# Patient Record
Sex: FEMALE | Race: ASIAN | Employment: OTHER | URBAN - METROPOLITAN AREA
[De-identification: names, ages, dates, MRNs, and addresses within clinical notes are randomized per-mention and may not be internally consistent; named-entity substitution may affect disease eponyms.]

---

## 2017-01-23 ENCOUNTER — APPOINTMENT (OUTPATIENT)
Dept: LAB | Age: 67
End: 2017-01-23
Attending: FAMILY MEDICINE
Payer: MEDICARE

## 2017-01-23 DIAGNOSIS — IMO0001 UNCONTROLLED TYPE 2 DIABETES MELLITUS WITHOUT COMPLICATION, WITHOUT LONG-TERM CURRENT USE OF INSULIN: ICD-10-CM

## 2017-01-23 DIAGNOSIS — E78.2 MIXED HYPERLIPIDEMIA: ICD-10-CM

## 2017-01-23 LAB
ALBUMIN SERPL-MCNC: 3.1 G/DL (ref 3.5–4.8)
ALP LIVER SERPL-CCNC: 154 U/L (ref 55–142)
ALT SERPL-CCNC: 24 U/L (ref 14–54)
AST SERPL-CCNC: 21 U/L (ref 15–41)
BILIRUB SERPL-MCNC: 0.5 MG/DL (ref 0.1–2)
BUN BLD-MCNC: 12 MG/DL (ref 8–20)
CALCIUM BLD-MCNC: 8.9 MG/DL (ref 8.3–10.3)
CHLORIDE: 101 MMOL/L (ref 101–111)
CHOLEST SMN-MCNC: 190 MG/DL (ref ?–200)
CO2: 31 MMOL/L (ref 22–32)
CREAT BLD-MCNC: 0.9 MG/DL (ref 0.55–1.02)
CREAT UR-SCNC: 109 MG/DL
EST. AVERAGE GLUCOSE BLD GHB EST-MCNC: 226 MG/DL (ref 68–126)
GLUCOSE BLD-MCNC: 170 MG/DL (ref 70–99)
HBA1C MFR BLD HPLC: 9.5 % (ref ?–5.7)
HDLC SERPL-MCNC: 61 MG/DL (ref 45–?)
HDLC SERPL: 3.11 {RATIO} (ref ?–4.44)
LDLC SERPL CALC-MCNC: 103 MG/DL (ref ?–130)
M PROTEIN MFR SERPL ELPH: 7.9 G/DL (ref 6.1–8.3)
MICROALBUMIN UR-MCNC: 28.7 MG/DL
MICROALBUMIN/CREAT 24H UR-RTO: 263.3 UG/MG (ref ?–30)
NONHDLC SERPL-MCNC: 129 MG/DL (ref ?–130)
POTASSIUM SERPL-SCNC: 3.6 MMOL/L (ref 3.6–5.1)
SODIUM SERPL-SCNC: 139 MMOL/L (ref 136–144)
TRIGLYCERIDES: 130 MG/DL (ref ?–150)
VLDL: 26 MG/DL (ref 5–40)

## 2017-01-23 PROCEDURE — 36415 COLL VENOUS BLD VENIPUNCTURE: CPT

## 2017-01-23 PROCEDURE — 83036 HEMOGLOBIN GLYCOSYLATED A1C: CPT

## 2017-01-23 PROCEDURE — 80061 LIPID PANEL: CPT

## 2017-01-23 PROCEDURE — 80053 COMPREHEN METABOLIC PANEL: CPT

## 2017-01-23 PROCEDURE — 82043 UR ALBUMIN QUANTITATIVE: CPT

## 2017-01-23 PROCEDURE — 82570 ASSAY OF URINE CREATININE: CPT

## 2017-02-22 ENCOUNTER — PRIOR ORIGINAL RECORDS (OUTPATIENT)
Dept: OTHER | Age: 67
End: 2017-02-22

## 2017-03-07 RX ORDER — METOPROLOL SUCCINATE 50 MG/1
TABLET, EXTENDED RELEASE ORAL
Qty: 90 TABLET | Refills: 0 | Status: SHIPPED | OUTPATIENT
Start: 2017-03-07 | End: 2017-06-08

## 2017-03-07 RX ORDER — AMLODIPINE BESYLATE 5 MG/1
TABLET ORAL
Qty: 90 TABLET | Refills: 0 | Status: SHIPPED | OUTPATIENT
Start: 2017-03-07 | End: 2017-06-02

## 2017-03-07 NOTE — TELEPHONE ENCOUNTER
Future Appointments  Date Time Provider Binu Barnes   3/24/2017 9:30 AM Vivian Solis MD EMG 21 EMG Rt 59     LOV 8./16     LAST LAB 1/17    LAST RX   AmLODIPine Besylate 5 MG Oral Tab 90 tablet 1 8/29/2016     Metoprolol Succinate ER 50 MG Oral

## 2017-03-21 ENCOUNTER — HOSPITAL ENCOUNTER (INPATIENT)
Facility: HOSPITAL | Age: 67
LOS: 2 days | Discharge: HOME HEALTH | DRG: 024 | End: 2017-03-24
Admitting: HOSPITALIST
Payer: MEDICARE

## 2017-03-21 ENCOUNTER — APPOINTMENT (OUTPATIENT)
Dept: CT IMAGING | Facility: HOSPITAL | Age: 67
DRG: 024 | End: 2017-03-21
Payer: MEDICARE

## 2017-03-21 DIAGNOSIS — I63.9 ACUTE ISCHEMIC STROKE (HCC): Primary | ICD-10-CM

## 2017-03-21 DIAGNOSIS — I48.20 CHRONIC ATRIAL FIBRILLATION (HCC): ICD-10-CM

## 2017-03-21 LAB
ALBUMIN SERPL-MCNC: 3.3 G/DL (ref 3.5–4.8)
ALP LIVER SERPL-CCNC: 152 U/L (ref 55–142)
ALT SERPL-CCNC: 29 U/L (ref 14–54)
APTT PPP: 31.4 SECONDS (ref 25–34)
AST SERPL-CCNC: 31 U/L (ref 15–41)
BASOPHILS # BLD AUTO: 0.07 X10(3) UL (ref 0–0.1)
BASOPHILS NFR BLD AUTO: 0.8 %
BILIRUB SERPL-MCNC: 0.4 MG/DL (ref 0.1–2)
BUN BLD-MCNC: 14 MG/DL (ref 8–20)
CALCIUM BLD-MCNC: 9.5 MG/DL (ref 8.3–10.3)
CHLORIDE: 102 MMOL/L (ref 101–111)
CO2: 31 MMOL/L (ref 22–32)
CREAT BLD-MCNC: 1.32 MG/DL (ref 0.55–1.02)
EOSINOPHIL # BLD AUTO: 0.6 X10(3) UL (ref 0–0.3)
EOSINOPHIL NFR BLD AUTO: 7.2 %
ERYTHROCYTE [DISTWIDTH] IN BLOOD BY AUTOMATED COUNT: 13.3 % (ref 11.5–16)
GLUCOSE BLD-MCNC: 116 MG/DL (ref 70–99)
HCT VFR BLD AUTO: 44.2 % (ref 34–50)
HGB BLD-MCNC: 14.8 G/DL (ref 12–16)
IMMATURE GRANULOCYTE COUNT: 0.02 X10(3) UL (ref 0–1)
IMMATURE GRANULOCYTE RATIO %: 0.2 %
INR BLD: 1.13 (ref 0.89–1.11)
LYMPHOCYTES # BLD AUTO: 3.14 X10(3) UL (ref 0.9–4)
LYMPHOCYTES NFR BLD AUTO: 37.9 %
M PROTEIN MFR SERPL ELPH: 8.6 G/DL (ref 6.1–8.3)
MCH RBC QN AUTO: 28.1 PG (ref 27–33.2)
MCHC RBC AUTO-ENTMCNC: 33.5 G/DL (ref 31–37)
MCV RBC AUTO: 84 FL (ref 81–100)
MONOCYTES # BLD AUTO: 0.72 X10(3) UL (ref 0.1–0.6)
MONOCYTES NFR BLD AUTO: 8.7 %
NEUTROPHIL ABS PRELIM: 3.74 X10 (3) UL (ref 1.3–6.7)
NEUTROPHILS # BLD AUTO: 3.74 X10(3) UL (ref 1.3–6.7)
NEUTROPHILS NFR BLD AUTO: 45.2 %
PLATELET # BLD AUTO: 269 10(3)UL (ref 150–450)
POTASSIUM SERPL-SCNC: 3.5 MMOL/L (ref 3.6–5.1)
PSA SERPL DL<=0.01 NG/ML-MCNC: 14.6 SECONDS (ref 12–14.3)
RBC # BLD AUTO: 5.26 X10(6)UL (ref 3.8–5.1)
RED CELL DISTRIBUTION WIDTH-SD: 41.1 FL (ref 35.1–46.3)
SODIUM SERPL-SCNC: 139 MMOL/L (ref 136–144)
TROPONIN: <0.046 NG/ML (ref ?–0.05)
WBC # BLD AUTO: 8.3 X10(3) UL (ref 4–13)

## 2017-03-21 PROCEDURE — 70496 CT ANGIOGRAPHY HEAD: CPT

## 2017-03-21 PROCEDURE — 70498 CT ANGIOGRAPHY NECK: CPT

## 2017-03-22 ENCOUNTER — APPOINTMENT (OUTPATIENT)
Dept: CV DIAGNOSTICS | Facility: HOSPITAL | Age: 67
DRG: 024 | End: 2017-03-22
Attending: Other
Payer: MEDICARE

## 2017-03-22 ENCOUNTER — APPOINTMENT (OUTPATIENT)
Dept: CT IMAGING | Facility: HOSPITAL | Age: 67
DRG: 024 | End: 2017-03-22
Payer: MEDICARE

## 2017-03-22 ENCOUNTER — PRIOR ORIGINAL RECORDS (OUTPATIENT)
Dept: OTHER | Age: 67
End: 2017-03-22

## 2017-03-22 ENCOUNTER — APPOINTMENT (OUTPATIENT)
Dept: MRI IMAGING | Facility: HOSPITAL | Age: 67
DRG: 024 | End: 2017-03-22
Attending: Other
Payer: MEDICARE

## 2017-03-22 ENCOUNTER — APPOINTMENT (OUTPATIENT)
Dept: GENERAL RADIOLOGY | Facility: HOSPITAL | Age: 67
DRG: 024 | End: 2017-03-22
Payer: MEDICARE

## 2017-03-22 ENCOUNTER — APPOINTMENT (OUTPATIENT)
Dept: INTERVENTIONAL RADIOLOGY/VASCULAR | Facility: HOSPITAL | Age: 67
DRG: 024 | End: 2017-03-22
Payer: MEDICARE

## 2017-03-22 PROBLEM — E87.6 HYPOKALEMIA: Status: ACTIVE | Noted: 2017-03-22

## 2017-03-22 PROBLEM — R73.9 HYPERGLYCEMIA: Status: ACTIVE | Noted: 2017-03-22

## 2017-03-22 PROBLEM — I63.9 ACUTE ISCHEMIC STROKE (HCC): Status: ACTIVE | Noted: 2017-03-22

## 2017-03-22 LAB
ALBUMIN SERPL-MCNC: 2.6 G/DL (ref 3.5–4.8)
ALP LIVER SERPL-CCNC: 114 U/L (ref 55–142)
ALT SERPL-CCNC: 19 U/L (ref 14–54)
AST SERPL-CCNC: 17 U/L (ref 15–41)
ATRIAL RATE: 153 BPM
BILIRUB DIRECT SERPL-MCNC: 0.1 MG/DL (ref 0.1–0.5)
BILIRUB SERPL-MCNC: 0.3 MG/DL (ref 0.1–2)
BILIRUB UR QL STRIP.AUTO: NEGATIVE
BUN BLD-MCNC: 14 MG/DL (ref 8–20)
CALCIUM BLD-MCNC: 8 MG/DL (ref 8.3–10.3)
CHLORIDE: 108 MMOL/L (ref 101–111)
CHOLEST SMN-MCNC: 157 MG/DL (ref ?–200)
CLARITY UR REFRACT.AUTO: CLEAR
CO2: 25 MMOL/L (ref 22–32)
COLOR UR AUTO: COLORLESS
CREAT BLD-MCNC: 0.84 MG/DL (ref 0.55–1.02)
ERYTHROCYTE [DISTWIDTH] IN BLOOD BY AUTOMATED COUNT: 13.6 % (ref 11.5–16)
EST. AVERAGE GLUCOSE BLD GHB EST-MCNC: 217 MG/DL (ref 68–126)
GLUCOSE BLD-MCNC: 144 MG/DL (ref 65–99)
GLUCOSE BLD-MCNC: 163 MG/DL (ref 65–99)
GLUCOSE BLD-MCNC: 219 MG/DL (ref 65–99)
GLUCOSE BLD-MCNC: 254 MG/DL (ref 70–99)
GLUCOSE UR STRIP.AUTO-MCNC: NEGATIVE MG/DL
HBA1C MFR BLD HPLC: 9.2 % (ref ?–5.7)
HCT VFR BLD AUTO: 35.2 % (ref 34–50)
HDLC SERPL-MCNC: 50 MG/DL (ref 45–?)
HDLC SERPL: 3.14 {RATIO} (ref ?–4.44)
HGB BLD-MCNC: 11.4 G/DL (ref 12–16)
INR BLD: 1.31 (ref 0.89–1.11)
ISTAT ACTIVATED CLOTTING TIME: 152 SECONDS (ref 74–137)
ISTAT ACTIVATED CLOTTING TIME: 173 SECONDS (ref 74–137)
KETONES UR STRIP.AUTO-MCNC: NEGATIVE MG/DL
LDLC SERPL CALC-MCNC: 79 MG/DL (ref ?–130)
LEUKOCYTE ESTERASE UR QL STRIP.AUTO: NEGATIVE
LIPASE: 117 U/L (ref 73–393)
M PROTEIN MFR SERPL ELPH: 6.5 G/DL (ref 6.1–8.3)
MCH RBC QN AUTO: 27.7 PG (ref 27–33.2)
MCHC RBC AUTO-ENTMCNC: 32.4 G/DL (ref 31–37)
MCV RBC AUTO: 85.6 FL (ref 81–100)
NITRITE UR QL STRIP.AUTO: NEGATIVE
NONHDLC SERPL-MCNC: 107 MG/DL (ref ?–130)
PH UR STRIP.AUTO: 8 [PH] (ref 4.5–8)
PLATELET # BLD AUTO: 191 10(3)UL (ref 150–450)
POTASSIUM SERPL-SCNC: 3.3 MMOL/L (ref 3.6–5.1)
POTASSIUM SERPL-SCNC: 3.6 MMOL/L (ref 3.6–5.1)
PROT UR STRIP.AUTO-MCNC: NEGATIVE MG/DL
PSA SERPL DL<=0.01 NG/ML-MCNC: 16.4 SECONDS (ref 12–14.3)
Q-T INTERVAL: 368 MS
QRS DURATION: 72 MS
QTC CALCULATION (BEZET): 450 MS
R AXIS: 21 DEGREES
RBC # BLD AUTO: 4.11 X10(6)UL (ref 3.8–5.1)
RBC UR QL AUTO: NEGATIVE
RED CELL DISTRIBUTION WIDTH-SD: 42.1 FL (ref 35.1–46.3)
SODIUM SERPL-SCNC: 141 MMOL/L (ref 136–144)
SP GR UR STRIP.AUTO: 1.01 (ref 1–1.03)
T AXIS: 37 DEGREES
TRIGLYCERIDES: 140 MG/DL (ref ?–150)
TSI SER-ACNC: 2.56 MIU/ML (ref 0.35–5.5)
UROBILINOGEN UR STRIP.AUTO-MCNC: <2 MG/DL
VENTRICULAR RATE: 90 BPM
VLDL: 28 MG/DL (ref 5–40)
WBC # BLD AUTO: 9.2 X10(3) UL (ref 4–13)

## 2017-03-22 PROCEDURE — 93306 TTE W/DOPPLER COMPLETE: CPT | Performed by: INTERNAL MEDICINE

## 2017-03-22 PROCEDURE — 93306 TTE W/DOPPLER COMPLETE: CPT

## 2017-03-22 PROCEDURE — B316YZZ FLUOROSCOPY OF RIGHT INTERNAL CAROTID ARTERY USING OTHER CONTRAST: ICD-10-PCS

## 2017-03-22 PROCEDURE — 71010 XR CHEST AP PORTABLE  (CPT=71010): CPT

## 2017-03-22 PROCEDURE — 70450 CT HEAD/BRAIN W/O DYE: CPT

## 2017-03-22 PROCEDURE — 70551 MRI BRAIN STEM W/O DYE: CPT

## 2017-03-22 PROCEDURE — 03CG3ZZ EXTIRPATION OF MATTER FROM INTRACRANIAL ARTERY, PERCUTANEOUS APPROACH: ICD-10-PCS

## 2017-03-22 PROCEDURE — 99223 1ST HOSP IP/OBS HIGH 75: CPT | Performed by: HOSPITALIST

## 2017-03-22 PROCEDURE — 99291 CRITICAL CARE FIRST HOUR: CPT | Performed by: OTHER

## 2017-03-22 RX ORDER — ONDANSETRON 2 MG/ML
4 INJECTION INTRAMUSCULAR; INTRAVENOUS EVERY 6 HOURS PRN
Status: DISCONTINUED | OUTPATIENT
Start: 2017-03-22 | End: 2017-03-24

## 2017-03-22 RX ORDER — FAMOTIDINE 20 MG/1
20 TABLET ORAL 2 TIMES DAILY
Status: DISCONTINUED | OUTPATIENT
Start: 2017-03-22 | End: 2017-03-22

## 2017-03-22 RX ORDER — MELATONIN
100 DAILY
Status: DISCONTINUED | OUTPATIENT
Start: 2017-03-22 | End: 2017-03-24

## 2017-03-22 RX ORDER — ONDANSETRON 2 MG/ML
4 INJECTION INTRAMUSCULAR; INTRAVENOUS EVERY 4 HOURS PRN
Status: DISCONTINUED | OUTPATIENT
Start: 2017-03-22 | End: 2017-03-22

## 2017-03-22 RX ORDER — ONDANSETRON 2 MG/ML
4 INJECTION INTRAMUSCULAR; INTRAVENOUS AS NEEDED
Status: DISCONTINUED | OUTPATIENT
Start: 2017-03-22 | End: 2017-03-22

## 2017-03-22 RX ORDER — ASPIRIN 325 MG
325 TABLET ORAL DAILY
Status: DISCONTINUED | OUTPATIENT
Start: 2017-03-22 | End: 2017-03-23

## 2017-03-22 RX ORDER — SODIUM CHLORIDE 9 MG/ML
INJECTION, SOLUTION INTRAVENOUS CONTINUOUS
Status: DISCONTINUED | OUTPATIENT
Start: 2017-03-22 | End: 2017-03-22

## 2017-03-22 RX ORDER — DEXTROSE MONOHYDRATE 25 G/50ML
50 INJECTION, SOLUTION INTRAVENOUS
Status: DISCONTINUED | OUTPATIENT
Start: 2017-03-22 | End: 2017-03-24

## 2017-03-22 RX ORDER — NITROGLYCERIN 20 MG/100ML
INJECTION INTRAVENOUS
Status: COMPLETED
Start: 2017-03-22 | End: 2017-03-22

## 2017-03-22 RX ORDER — FOLIC ACID 1 MG/1
1 TABLET ORAL DAILY
Status: DISCONTINUED | OUTPATIENT
Start: 2017-03-22 | End: 2017-03-24

## 2017-03-22 RX ORDER — ASPIRIN 300 MG
300 SUPPOSITORY, RECTAL RECTAL ONCE
Status: COMPLETED | OUTPATIENT
Start: 2017-03-22 | End: 2017-03-22

## 2017-03-22 RX ORDER — FAMOTIDINE 10 MG/ML
20 INJECTION, SOLUTION INTRAVENOUS EVERY 12 HOURS SCHEDULED
Status: DISCONTINUED | OUTPATIENT
Start: 2017-03-22 | End: 2017-03-24

## 2017-03-22 RX ORDER — ACETAMINOPHEN 10 MG/ML
1000 INJECTION, SOLUTION INTRAVENOUS AS NEEDED
Status: ACTIVE | OUTPATIENT
Start: 2017-03-22 | End: 2017-03-22

## 2017-03-22 RX ORDER — POTASSIUM CHLORIDE 20 MEQ/1
40 TABLET, EXTENDED RELEASE ORAL EVERY 4 HOURS
Status: COMPLETED | OUTPATIENT
Start: 2017-03-22 | End: 2017-03-22

## 2017-03-22 RX ORDER — HEPARIN SODIUM 5000 [USP'U]/ML
INJECTION, SOLUTION INTRAVENOUS; SUBCUTANEOUS
Status: COMPLETED
Start: 2017-03-22 | End: 2017-03-22

## 2017-03-22 RX ORDER — NALOXONE HYDROCHLORIDE 0.4 MG/ML
80 INJECTION, SOLUTION INTRAMUSCULAR; INTRAVENOUS; SUBCUTANEOUS AS NEEDED
Status: ACTIVE | OUTPATIENT
Start: 2017-03-22 | End: 2017-03-22

## 2017-03-22 RX ORDER — ASPIRIN 325 MG
325 TABLET ORAL ONCE
Status: DISCONTINUED | OUTPATIENT
Start: 2017-03-22 | End: 2017-03-22 | Stop reason: SDUPTHER

## 2017-03-22 RX ORDER — SODIUM CHLORIDE 9 MG/ML
1000 INJECTION, SOLUTION INTRAVENOUS ONCE
Status: COMPLETED | OUTPATIENT
Start: 2017-03-22 | End: 2017-03-22

## 2017-03-22 RX ORDER — LABETALOL HYDROCHLORIDE 5 MG/ML
10 INJECTION, SOLUTION INTRAVENOUS EVERY 10 MIN PRN
Status: DISCONTINUED | OUTPATIENT
Start: 2017-03-22 | End: 2017-03-24

## 2017-03-22 RX ORDER — ACETAMINOPHEN 325 MG/1
TABLET ORAL
Status: COMPLETED
Start: 2017-03-22 | End: 2017-03-22

## 2017-03-22 RX ORDER — FAMOTIDINE 20 MG/1
20 TABLET ORAL EVERY 12 HOURS SCHEDULED
Status: DISCONTINUED | OUTPATIENT
Start: 2017-03-22 | End: 2017-03-24

## 2017-03-22 RX ORDER — SODIUM CHLORIDE, SODIUM LACTATE, POTASSIUM CHLORIDE, CALCIUM CHLORIDE 600; 310; 30; 20 MG/100ML; MG/100ML; MG/100ML; MG/100ML
INJECTION, SOLUTION INTRAVENOUS CONTINUOUS
Status: DISCONTINUED | OUTPATIENT
Start: 2017-03-22 | End: 2017-03-22

## 2017-03-22 RX ORDER — DOXEPIN HYDROCHLORIDE 50 MG/1
1 CAPSULE ORAL DAILY
Status: DISCONTINUED | OUTPATIENT
Start: 2017-03-22 | End: 2017-03-24

## 2017-03-22 RX ORDER — ACETAMINOPHEN 325 MG/1
650 TABLET ORAL EVERY 6 HOURS PRN
Status: DISCONTINUED | OUTPATIENT
Start: 2017-03-22 | End: 2017-03-24

## 2017-03-22 RX ORDER — SODIUM CHLORIDE 9 MG/ML
INJECTION, SOLUTION INTRAVENOUS CONTINUOUS
Status: ACTIVE | OUTPATIENT
Start: 2017-03-22 | End: 2017-03-24

## 2017-03-22 RX ORDER — VERAPAMIL HYDROCHLORIDE 2.5 MG/ML
INJECTION, SOLUTION INTRAVENOUS
Status: DISCONTINUED
Start: 2017-03-22 | End: 2017-03-22 | Stop reason: WASHOUT

## 2017-03-22 RX ORDER — SODIUM CHLORIDE 9 MG/ML
INJECTION, SOLUTION INTRAVENOUS ONCE AS NEEDED
Status: COMPLETED | OUTPATIENT
Start: 2017-03-22 | End: 2017-03-22

## 2017-03-22 RX ORDER — LIDOCAINE HYDROCHLORIDE 10 MG/ML
INJECTION, SOLUTION INFILTRATION; PERINEURAL
Status: COMPLETED
Start: 2017-03-22 | End: 2017-03-22

## 2017-03-22 NOTE — ED NOTES
Pt arrives via EMS for stroke symptoms, last normal at 2030, according to family she was seen without symptoms at dinner. Daughter noted slurred speech and facial droop at \"around 10 pm\" and 911 was called.  Dr. Deangelo Hernandez is at bedside upon arrival.

## 2017-03-22 NOTE — PLAN OF CARE
METABOLIC/FLUID AND ELECTROLYTES - ADULT    • Electrolytes maintained within normal limits Progressing        NEUROLOGICAL - ADULT    • Achieves stable or improved neurological status Progressing        RESPIRATORY - ADULT    • Achieves optimal ventilation

## 2017-03-22 NOTE — PROGRESS NOTES
[facility]  Progress Note     Sunny Espinosa Patient Status:  Inpatient    1950 MRN [id number]   [facility] 6NE-A Attending Conner Rodríguez MD   Hosp Day # 1 PCP Jordana Amaya MD     Chief Complaint:  Left sided weakness an Date   TSH 2.560 03/22/2017       Imaging: Imaging data reviewed in Epic.   MICRO:   Medications:   • famoTIDine  20 mg Oral 2 times per day    Or   • famoTIDine  20 mg Intravenous 2 times per day   • folic acid  1 mg Oral Daily   • Vitamin B-1  100 mg Oral

## 2017-03-22 NOTE — PROGRESS NOTES
[facility]  Interventional Neuroradiology Progress Note    Glo Summers Patient Status:  Inpatient    1950 MRN [id number]   [facility] 6NE-A Attending Madelaine Cardenas MD   Hosp Day # 1 PCP Erika Holloway MD       Subjective: .0 03/21/2017   CREATSERUM 0.84 03/22/2017   BUN 14 03/22/2017    03/22/2017   K 3.3 03/22/2017    03/22/2017   CO2 25.0 03/22/2017    03/22/2017   CA 8.0 03/22/2017   ALB 2.6 03/22/2017   ALKPHO 114 03/22/2017   BILT 0.3 03/22/ in both upper lobes.        Assessment/Plan:  1. L-sided facial droop and weakness, s/p mechanical thrombectomy by Dr. Mackey Nurse, with TICI 2b reperfusion  1. -160  2. [place] following  3. Ischemic order set in place  4.  Repeat CTH today (3/22) no acute p

## 2017-03-22 NOTE — ED PROVIDER NOTES
Patient Seen in: [facility] Emergency Department    History   Patient presents with:  Stroke (neurologic)    Stated Complaint: stroke    HPI    Patient is a 12-year-old who has a history of diabetes and atrial fibrillation presenting with a complain daily with meals. Losartan Potassium 25 MG Oral Tab,  Take 1 tablet (25 mg total) by mouth once daily. ONETOUCH DELICA LANCETS FINE Does not apply Misc,  1 each by Other route 2 (two) times daily.    Glucose Blood (ONETOUCH ULTRA BLUE) In Vitro Strip, costovertebral angle tenderness. Extremities: Warm, well perfused, without edema    No significant deformity or joint abnormality    Calves are symmetric and nontender  Good peripheral color, cap refill .        Skin: Unremarkable without lesions or rachel ------                     CBC W/ DIFFERENTIAL[[id number]]          Abnormal            Final result                 Please view results for these tests on the individual orders.    RAINBOW DRAW BLUE   RAINBOW DRAW GOLD   RAINBOW DRAW LAVENDER   RAINBOW DRAW parietal lobes consistent with chronic microvascular ischemic changes. Superimposed acute infarction is difficult to exclude. There is no acute intracranial hemorrhage. There is no mass effect or midline shift.   No abnormal intra-or extra-axial fluid genny carotid artery is widely patent. There is a patent bifurcation of the right common carotid artery. Right external and right internal carotid arteries are patent. Mild tortuosity of the proximal right ICA.  No significant right internal carotid artery stenos occlusion involving the distal M1 branch of the right middle cerebral artery. There is some collateral filling of the more distal right middle cerebral artery branches.  The critical test result findings were initially called to  Dr. Celia Jimenes at 11:53 PM on candidate, I spoke with the radiologist repeatedly with regard to quick readings on the CTs and immediately spoke with the neuro interventionalist Dr. Ev Miguel for consultation. He is calling in the team to take the patient to the lab for intervention.   Minda Melgar

## 2017-03-22 NOTE — SIGNIFICANT EVENT
Responded to code stroke paged at 23:39 to ED A3. This RN met pt in CT scan at 23:44. Pt is a 79 y.o. female with hx of afib, DM, HTN and family hx of CVA. Current medications include eliquis. Pt last seen nl per family around 2030 while eating dinner.  Sym

## 2017-03-22 NOTE — PAYOR COMM NOTE
Attending Physician: Jarred Cunningham MD    Review Type: ADMISSION   Reviewer: Flor Mckeon       Date: March 22, 2017 - 8:27 AM  Payor: [address] Number: N/A  Admit date: 3/21/2017 11:22 PM   Admitted from Emergency Dept.: yes with appropriate read back. 2. There is mediastinal lymphadenopathy. Please correlate clinically. Differential considerations include lymphoproliferative processes and metastatic disease.  Lymphoma is within the differential. Reactive inflammatory adenopath (*)     Potassium 3.3 (*)     All other components within normal limits   PROTHROMBIN TIME (PT) - Abnormal; Notable for the following:     PT 16.4 (*)     INR 1.31 (*)     All other components within normal limits   POCT ISTAT ACTIVATED CLOTTING TIME - Abn not a TPA candidate  2. Chronic atrial fibrillation on Eliquis heart rate controlled  3. Incidental finding of mediastinal lymphadenopathy to be evaluated further  4. Pulmonary fibrosis noted in both upper lobes  5. Diabetes type 2 controlled  6.  Permissiv

## 2017-03-22 NOTE — PHYSICAL THERAPY NOTE
Attempted to see patient this morning, however currently on bedrest for 24 hours. Will follow up with patient tomorrow.

## 2017-03-22 NOTE — ED NOTES
Pt transported to Interventional Lab, accompanied by stroke navigator Henny Cuevas RN and this RN/monitor. Pt assisted up to [facility] prior to transfer and lower extremity weakness noted and both pt and daughter acknowledge that pt is too weak to be up OOB.  Bedpan

## 2017-03-22 NOTE — DIETARY NOTE
Nutrition Short Note    Dietitian consult received for diabetes diet education. Appropriate education and handout provided to pt's family member at bedside. All questions answered. RD available PRN.     Stephen Victoria RD, LDN

## 2017-03-22 NOTE — ED NOTES
Pt demanding to get OOB to go to BR. Pt refusing bedpan, and family unable to persuade pt to try bedpan. Pt is combative and insisting she wants to walk to BR. BSC was offered and pt assisted to [facility], female family members are at bedside throughout.  Pt's pr

## 2017-03-22 NOTE — PROGRESS NOTES
[facility]  Neurocritical Care APN Progress Note    NAME: Catrina Sapp - ROOM: FATMATA/FATMATA - MRN: [id number] - Age: 79year old - :1950    History Of Present Illness:  Catrina Sapp is a 79year old female with PMHx significant Skin: Skin color, texture, turgor normal for ethnicity, no rashes or lesions, warm and dry  Neurologic: This patient is alert and orientated x 3. Speech fluent. Pupils equally round and reactive to light. 3+ brisk bilaterally. EOMs intact.   Visual field findings are suspicious for hyperdense MCA sign and intraluminal thrombus.  Differential would include vascular mural calcifications.      There is decreased attenuation in the periventricular and subcortical deep white matter bilaterally in the frontal, te The left subclavian artery origin is widely patent. The proximal left vertebral artery is tortuous but widely patent. The left vertebral artery is patent throughout its course without evidence for dissection or significant stenosis.  There is a patent    le         There is mediastinal lymphadenopathy. Prevascular space lymph node measures 2.6 x 1.8 cm. Right lower paratracheal node measures 1.8 x 1.2 cm. Left upper paratracheal node measures 1.0 x 1.2 cm.  Aortopulmonary window node measures 1.5 x 1.0 cm.   TP 8.6 03/21/2017   AST 31 03/21/2017   ALT 29 03/21/2017   PTT 31.4 03/21/2017   INR 1.13 03/21/2017       Assessment/Plan:  1.  Ischemic Stroke/ M1 right MCA occulusion s/p thrombectomy       - Ischemic stroke order set      - Neuro checks Q1h      - NIH

## 2017-03-22 NOTE — PROCEDURES
[facility]  Interventional Neuroradiology Procedure Note      Ambrocio Villegas Patient Status:  Emergency    1950 MRN [id number]   Location [address] [facility] Attending Nilsa Pichardo MD   [facility] Day # 1 PCP Deepak Matos

## 2017-03-22 NOTE — OCCUPATIONAL THERAPY NOTE
Attempted to see patient this AM, however currently on bedrest for 24 hours.  Will follow up with patient tomorrow

## 2017-03-22 NOTE — CONSULTS
[facility]  Neurocritical Care       Name: Lupillo Hodge  MRN: [id number]  Admission Date/Time: 3/21/2017 11:22 PM  Primary Care Provider:  Alona Moore MD        Reason for Consultation:   Left sided weakness - Acute RMCA M1 occ BESYLATE 5 MG Oral Tab TAKE 1 TABLET(5 MG) BY MOUTH EVERY DAY Disp: 90 tablet Rfl: 0    METOPROLOL SUCCINATE ER 50 MG Oral Tablet 24 Hr TAKE 1 TABLET(50 MG) BY MOUTH EVERY DAY Disp: 90 tablet Rfl: 0    apixaban 2.5 MG Oral Tab Take 5 mg by mouth 2 (two) ti Facility-Administered Medications:  Labetalol HCl (TRANDATE) injection 10 mg 10 mg Intravenous Q10 Min PRN   niCARdipine HCl in NaCl (CARDENE) 20 mg/200 ml premix infusion 5-15 mg/hr Intravenous Continuous PRN   aspirin tab 325 mg 325 mg Oral Once   Atropi currently breastfeeding. Body mass index is 28.98 kg/(m^2). Intake/Output:  No intake or output data in the 24 hours ending 03/22/17 0730    HEENT- NC/AT, Neck supple.   Lungs - Clear  Heart - S1,S2    NEUROLOGICAL EXAMINATION:    Mental Awake, alert, o increased density in the distal M1 segment of the right middle cerebral artery. There is also a focus of increased density within a posterior branch of the right MCA within the sylvian fissure.  The findings are suspicious for hyperdense MCA sign and intral vertebral artery is tortuous but widely patent. The left vertebral artery is patent throughout its course without evidence for dissection or significant stenosis. There is a patent left PICA. The basilar artery and its branches appear widely patent.  The b paratracheal node measures 1.8 x 1.2 cm. Left upper paratracheal node measures 1.0 x 1.2 cm. Aortopulmonary window node measures 1.5 x 1.0 cm.  Subpleural nodularity identified in the right upper lobe with a 4 mm noncalcified pulmonary nodule seen on image  03/22/2017   TROP <0.046 03/21/2017     Recent Labs   Lab  03/21/17   2325  03/22/17   0600   GLU  116*  254*   BUN  14  14   CREATSERUM  1.32*  0.84   CA  9.5  8.0*   ALB  3.3*  2.6*   NA  139  141   K  3.5*  3.3*   CL  102  108   CO2  31.0  25. Spoke with family at bedside and gave them an update. G: [place] no, [place] no    Goals of the Day:  -160, PT/OT today, Ct Marilyn Monica today.     A total of 35 minutes of critical care time (exclusive of billable procedures) was administered to [facility]

## 2017-03-22 NOTE — SLP NOTE
ADULT SWALLOWING EVALUATION    ASSESSMENT & PLAN   ASSESSMENT  Patient seen for swallowing evaluation per stroke protocol. Patient alert with daughter at bedside, supportive and contributing to history.   Patient left facial droop almost completely resolve Diet Prior to Admission: Regular; Thin liquids  Precautions: Aspiration    Patient/Family Goals: To eat and drink safely    SWALLOWING HISTORY  Current Diet Consistency: Regular; Thin liquids  Dysphagia History: as above  Imaging Results:   CXR from 3/22/1 (mionimal)  Range of Motion: Reduced left facial  Rate of Motion: Within Functional Limits    Voice Quality: Clear  Respiratory Status: Unlabored  Consistencies Trialed:  Thin liquids;Puree;Hard solid  Method of Presentation: Self presentation;Cup;Single si

## 2017-03-22 NOTE — H&P
[facility]  History and Physical     Donovan Weaver Patient Status:  Emergency    1950 MRN [id number]   Location 6[address] [facility] Attending Kit Prieto MD   [facility] Day # 1 PCP Jeana Pringle MD     Chief Complain encounter.   Current Outpatient Prescriptions on File Prior to Encounter:  AMLODIPINE BESYLATE 5 MG Oral Tab TAKE 1 TABLET(5 MG) BY MOUTH EVERY DAY Disp: 90 tablet Rfl: 0   METOPROLOL SUCCINATE ER 50 MG Oral Tablet 24 Hr TAKE 1 TABLET(50 MG) BY MOUTH EVERY deformity. Abdomen: Soft, nontender, nondistended. Positive bowel sounds. No rebound, guarding or organomegaly. Neurologic: left ue and left le strength 1/5, slurred speech, neglect  Musculoskeletal: Moves all extremities.   Extremities: No edema or cyan

## 2017-03-23 ENCOUNTER — APPOINTMENT (OUTPATIENT)
Dept: CT IMAGING | Facility: HOSPITAL | Age: 67
DRG: 024 | End: 2017-03-23
Attending: NURSE PRACTITIONER
Payer: MEDICARE

## 2017-03-23 LAB
BUN BLD-MCNC: 6 MG/DL (ref 8–20)
CALCIUM BLD-MCNC: 8.3 MG/DL (ref 8.3–10.3)
CHLORIDE: 106 MMOL/L (ref 101–111)
CO2: 26 MMOL/L (ref 22–32)
CREAT BLD-MCNC: 0.7 MG/DL (ref 0.55–1.02)
GLUCOSE BLD-MCNC: 168 MG/DL (ref 65–99)
GLUCOSE BLD-MCNC: 195 MG/DL (ref 65–99)
GLUCOSE BLD-MCNC: 207 MG/DL (ref 65–99)
GLUCOSE BLD-MCNC: 233 MG/DL (ref 70–99)
HAV IGM SER QL: 1.8 MG/DL (ref 1.7–3)
PHOSPHATE SERPL-MCNC: 1.5 MG/DL (ref 2.5–4.9)
POTASSIUM SERPL-SCNC: 3.3 MMOL/L (ref 3.6–5.1)
SODIUM SERPL-SCNC: 140 MMOL/L (ref 136–144)

## 2017-03-23 PROCEDURE — 70450 CT HEAD/BRAIN W/O DYE: CPT

## 2017-03-23 PROCEDURE — 99233 SBSQ HOSP IP/OBS HIGH 50: CPT | Performed by: OTHER

## 2017-03-23 PROCEDURE — 99233 SBSQ HOSP IP/OBS HIGH 50: CPT | Performed by: HOSPITALIST

## 2017-03-23 RX ORDER — POTASSIUM CHLORIDE 20 MEQ/1
40 TABLET, EXTENDED RELEASE ORAL EVERY 4 HOURS
Status: COMPLETED | OUTPATIENT
Start: 2017-03-23 | End: 2017-03-23

## 2017-03-23 RX ORDER — MAGNESIUM OXIDE 400 MG (241.3 MG MAGNESIUM) TABLET
400 TABLET ONCE
Status: COMPLETED | OUTPATIENT
Start: 2017-03-23 | End: 2017-03-23

## 2017-03-23 RX ORDER — LOSARTAN POTASSIUM 25 MG/1
25 TABLET ORAL DAILY
Status: DISCONTINUED | OUTPATIENT
Start: 2017-03-24 | End: 2017-03-24

## 2017-03-23 RX ORDER — HEPARIN SODIUM 5000 [USP'U]/ML
5000 INJECTION, SOLUTION INTRAVENOUS; SUBCUTANEOUS EVERY 8 HOURS SCHEDULED
Status: DISCONTINUED | OUTPATIENT
Start: 2017-03-23 | End: 2017-03-23

## 2017-03-23 NOTE — PROGRESS NOTES
[facility]  Interventional Neuroradiology Progress Note    Berta Fry Patient Status:  Inpatient    1950 MRN [id number]   [facility] 6NE-A Attending Moy Raygoza MD   Hosp Day # 2 PCP Omid Clark MD       Subjective: Date   WBC 9.2 03/22/2017   HGB 11.4 03/22/2017   HCT 35.2 03/22/2017   .0 03/22/2017   CREATSERUM 0.70 03/23/2017   BUN 6 03/23/2017    03/23/2017   K 3.3 03/23/2017    03/23/2017   CO2 26.0 03/23/2017    03/23/2017   CA 8.3 03/2 lymphadenopathy. Please correlate clinically. Differential considerations include lymphoproliferative processes and metastatic disease.  Lymphoma is within the differential. Reactive inflammatory adenopathy is also within the    differential. Followup is ne

## 2017-03-23 NOTE — OCCUPATIONAL THERAPY NOTE
OCCUPATIONAL THERAPY EVALUATION - INPATIENT     Room Number: 4933/1030-X  Evaluation Date: 3/23/2017  Type of Evaluation: Initial  Presenting Problem: CVA    Physician Order: IP Consult to Occupational Therapy  Reason for Therapy: ADL/IADL Dysfunction and limits    SENSATION  Light touch:  intact    Communication: Pt is able to communicate all basic needs and wants    Behavioral/Emotional/Social: Pt was participated in and was motivated for therapy today.     RANGE OF MOTION AND STRENGTH ASSESSMENT  Upper ex dysfunction, activity tolerance. These deficits manifest functionally while performing mobility and self-care.    The patient is below baseline and would benefit from skilled inpatient OT to address the above deficits, maximizing patient's ability to retur

## 2017-03-23 NOTE — PLAN OF CARE
Pt alert and oriented x 4; STEPHENS with minimal Lt sided weakness;  Lt facial droop; speech clear;   CARDIOVASCULAR - ADULT    • Absence of cardiac arrhythmias or at baseline Progressing        Impaired Functional Mobility    • Achieve highest/safest level of m

## 2017-03-23 NOTE — PLAN OF CARE
Transferred from unit around 11. A/Ox4. Afib per tele. RA. Neurologically intact. No deficits noted. Groin site c/d/i, soft. C/o mild pain r/t groin site. Relieved with tylenol. Needs attended to.     CARDIOVASCULAR - ADULT    • Maintains optimal cardi

## 2017-03-23 NOTE — PHYSICAL THERAPY NOTE
PHYSICAL THERAPY EVALUATION - INPATIENT     Room Number: 2493/8768-H  Evaluation Date: 3/23/2017  Type of Evaluation: Initial  Physician Order: PT Eval and Treat    Presenting Problem: s/p CVA 3/21/17 and thrombectomy 3/22/17  Reason for Therapy: Bear [place] following:    Right Hip flexion  unable to rate due to pain in groin  Left Hip flexion  unable to rate due to pain in L knee  Right Knee extension  4-/5  Left Knee extension  4-/5  Right Dorsiflexion  4/5  Left Dorsiflexion  4/5    BALANCE  Static Sitting: with no AD and CGA at times for periods of instability. VC provided during ambulation for upright posture and forward head. Gait remarkable for decreased B heel strike and decreased speed.  Patient returned to room and sat upright in bedside chair with call 3/23/2017

## 2017-03-23 NOTE — PROGRESS NOTES
[facility]  Progress Note     Ray Rod Patient Status:  Inpatient    1950 MRN [id number]   [facility] 6NE-A Attending Jose Bowie MD   Hosp Day # 2 PCP Barby Fernandes MD     Chief Complaint:  Left sided weakness an 1.31*       Recent Labs   Lab  03/21/17   0063   TROP  <0.046            Imaging: Imaging data reviewed in Epic.   MICRO: None  Medications:   • potassium & sodium phosphates  1 packet Oral TID CC   • apixaban  5 mg Oral BID   • famoTIDine  20 mg Oral 2 kameron today that will help with heart rate control  Resume Cozaar 25 mg daily starting in the a.m. Will see if needs Norvasc resumed  BMP and phosphorus in the a.m.   Referred to diabetic education center      Estimated date of discharge: 1-2 days  Discharge is

## 2017-03-23 NOTE — PROGRESS NOTES
[facility]  Neuro Critical Care Progress Note    rOtiz Banks Patient Status:  Inpatient    1950 MRN [id number]   [facility] 6NE-A Attending Joyce Womack MD   Hosp Day # 2 PCP Meliton Shah MD     Subjective:  Patient is premix infusion, 0.5-30 mcg/min, Intravenous, Continuous PRN  •  ondansetron HCl (ZOFRAN) injection 4 mg, 4 mg, Intravenous, Q6H PRN  •  famoTIDine (PEPCID) tab 20 mg, 20 mg, Oral, 2 times per day **OR** famoTIDine (PEPCID) injection 20 mg, 20 mg, Intraven statins.      - Will restart Eliquis today risks/benifits discussed with patient. Her strokes are on the smaller size.  Will need CT head before discharge      - OT, PT, and ST    ABCDEF:  A: Pain score 2-3/10, Meds Tylenol  B: RA    C: RASS 0  D: CAM ICU -

## 2017-03-24 ENCOUNTER — PRIOR ORIGINAL RECORDS (OUTPATIENT)
Dept: OTHER | Age: 67
End: 2017-03-24

## 2017-03-24 VITALS
WEIGHT: 147.94 LBS | HEART RATE: 82 BPM | TEMPERATURE: 99 F | RESPIRATION RATE: 20 BRPM | OXYGEN SATURATION: 100 % | SYSTOLIC BLOOD PRESSURE: 148 MMHG | BODY MASS INDEX: 27 KG/M2 | DIASTOLIC BLOOD PRESSURE: 89 MMHG

## 2017-03-24 LAB
BUN BLD-MCNC: 10 MG/DL (ref 8–20)
CALCIUM BLD-MCNC: 9.2 MG/DL (ref 8.3–10.3)
CHLORIDE: 102 MMOL/L (ref 101–111)
CO2: 26 MMOL/L (ref 22–32)
CREAT BLD-MCNC: 0.86 MG/DL (ref 0.55–1.02)
GLUCOSE BLD-MCNC: 154 MG/DL (ref 65–99)
GLUCOSE BLD-MCNC: 154 MG/DL (ref 65–99)
GLUCOSE BLD-MCNC: 162 MG/DL (ref 65–99)
GLUCOSE BLD-MCNC: 170 MG/DL (ref 70–99)
HAV IGM SER QL: 1.9 MG/DL (ref 1.7–3)
PHOSPHATE SERPL-MCNC: 2.7 MG/DL (ref 2.5–4.9)
POTASSIUM SERPL-SCNC: 3.7 MMOL/L (ref 3.6–5.1)
SODIUM SERPL-SCNC: 137 MMOL/L (ref 136–144)

## 2017-03-24 PROCEDURE — 99232 SBSQ HOSP IP/OBS MODERATE 35: CPT | Performed by: OTHER

## 2017-03-24 PROCEDURE — 99239 HOSP IP/OBS DSCHRG MGMT >30: CPT | Performed by: HOSPITALIST

## 2017-03-24 RX ORDER — POTASSIUM CHLORIDE 20 MEQ/1
40 TABLET, EXTENDED RELEASE ORAL ONCE
Status: COMPLETED | OUTPATIENT
Start: 2017-03-24 | End: 2017-03-24

## 2017-03-24 NOTE — CM/SW NOTE
03/24/17 1400   Discharge disposition   Discharged to: Home-Health   Name of Facillity/Home Care/Hospice [facility] of   Home services after discharge Skilled home care   Discharge transportation Private car

## 2017-03-24 NOTE — PLAN OF CARE
Discharge instructions reviewed with patient. All questions answered, Follow-ups reviewed. Patient demonstrates understanding of discharge instructions. Patient stable for discharge.         CARDIOVASCULAR - ADULT    • Maintains optimal cardiac output and h

## 2017-03-24 NOTE — CM/SW NOTE
[facility]. Pt did have a CVA. She is from [place] and lives with her dtr. She has three other dtrs living in the [address] [place] [address] but only one other dtr lives locally. Pt is .   She is usually independent for her adls, walks un

## 2017-03-24 NOTE — PROGRESS NOTES
[facility]  Progress Note     Jonathan Appiah Patient Status:  Inpatient    1950 MRN [id number]   [facility] 6NE-A Attending Zarina Cartagena MD   Hosp Day # 3 PCP Aaron Vivar MD     Chief Complaint:  Left sided weakness an 03/21/17   5146   TROP  <0.046            Imaging: Imaging data reviewed in Epic.   MICRO: None  Medications:   • apixaban  5 mg Oral BID   • Losartan Potassium  25 mg Oral Daily   • metoprolol tartrate  25 mg Oral 2x Daily(Beta Blocker)   • famoTIDine  20

## 2017-03-24 NOTE — PROGRESS NOTES
[id number] [facility] Neurology Progress Note    Romaine Emilia Patient Status:  Inpatient    1950 MRN [id number]   [facility] 7NE-A Attending Chaitanya Quinones MD   Hosp Day # 3 PCP Estefanía Carpio MD     Chief Complaint:   Follow up f Panel  TC  157  TRG  140  HDL  50  LDL  79    HgA1C  9.2    Imaging:  TTE 3/22/2017  Conclusions:    1. Left ventricle: The cavity size was normal. Wall thickness was normal.     Systolic function was normal. The estimated ejection fraction was 60-65%.      within the differential. Reactive inflammatory adenopathy is also within the    differential. Followup is necessary. 3. Chronic microvascular ischemic changes are again identified. 4. No acute intracranial hemorrhage.   5. Pulmonary fibrotic changes are n details, all questions were answered to satisfactory.        Elisa Barroso  Neurologist and Neuromuscular specialist  Board certified neuro- electrodiagnostic medicine  [facility]

## 2017-03-24 NOTE — PROGRESS NOTES
Assumed care at 1900. Vital signs stable. No acute events overnight.   Alert and oriented X4  Afib on tele (on eliquis)      Alert and oriented x4  Follows commands  PERRL, EOMI  Minor Lt droop  Motor strength is 5/5 all extremities  No drift  No decrease

## 2017-03-25 NOTE — DISCHARGE SUMMARY
[facility]  DISCHARGE SUMMARY     Abdi Dale Patient Status:  Inpatient    1950 MRN [id number]   [facility] 7NE-A Attending No att. providers found   Hosp Day # 3 PCP Jeana Pringle MD     Date of Admission: 3/21/2017 left facial droop and slurred speech. She was found to have an acute R MCA stroke secondary to MCA M1 occlusion.   This was felt likely due to noncompliance with anticoagulation as patient is not compliant with Eliquis which she takes for atrial fibrillati Quantity:  3 Bottle   Refills:  3       GlipiZIDE ER 5 MG Tb24   Commonly known as:  GLIPIZIDE XL        Take 1 tablet (5 mg total) by mouth 2 (two) times daily with meals.     Quantity:  180 tablet   Refills:  1       Losartan Potassium 25 MG Tabs   Last t Musculoskeletal: Moves all extremities. Extremities: No edema.   -----------------------------------------------------------------------------------------------  PATIENT DISCHARGE INSTRUCTIONS: See electronic chart    Carl Estes MD 3/25/2017    Time s

## 2017-03-27 ENCOUNTER — PATIENT OUTREACH (OUTPATIENT)
Dept: CASE MANAGEMENT | Age: 67
End: 2017-03-27

## 2017-03-27 DIAGNOSIS — I63.9 ACUTE ISCHEMIC STROKE (HCC): Primary | ICD-10-CM

## 2017-03-27 DIAGNOSIS — I48.20 CHRONIC ATRIAL FIBRILLATION (HCC): ICD-10-CM

## 2017-03-27 LAB — PHOSPHOLIPIDS, SERUM: 226 MG/DL

## 2017-03-27 NOTE — PROGRESS NOTES
Initial Post Discharge Follow Up   Discharge Date: 3/24/17  Contact Date: 3/27/2017    Consent Verification:  Assessment Completed With: Patient  HIPAA Verified? Yes    1.  Tell me why you were in the hospital?  Patient states that she had a headache and b do you have about your medications? Per patient no questions. 5. How often do you forget to take your medicine? Per patient I forget sometimes. 6. Do you stop taking your medicine when you feel better? No    7.  Do you ever stop taking your medicin using the Rule of 15  Following the assessment, education will be customized to assessed educational needs.   Basal rates, insulin to carbohydrate and correction factor ratios will be calculated and, if already in use, will  be reviewed for accuracy and rec [address] [facility], [facility]  [address] [facility], [address]  [address] [address], [facility]

## 2017-03-30 NOTE — PROGRESS NOTES
[facility]    Patients Name: Adriane Chang  Attending Physician: Zamzam att. providers found  CSN: [id number]    Location:  6967/3757-X  MRN: [id number]    YOB: 1950  Admission Date: 3/21/2017     Anesthesia Post-op Note        Patient loca

## 2017-04-03 ENCOUNTER — MED REC SCAN ONLY (OUTPATIENT)
Dept: FAMILY MEDICINE CLINIC | Facility: CLINIC | Age: 67
End: 2017-04-03

## 2017-04-10 ENCOUNTER — TELEPHONE (OUTPATIENT)
Dept: FAMILY MEDICINE CLINIC | Facility: CLINIC | Age: 67
End: 2017-04-10

## 2017-04-10 ENCOUNTER — OFFICE VISIT (OUTPATIENT)
Dept: FAMILY MEDICINE CLINIC | Facility: CLINIC | Age: 67
End: 2017-04-10

## 2017-04-10 ENCOUNTER — OFFICE VISIT (OUTPATIENT)
Dept: NEUROLOGY | Facility: CLINIC | Age: 67
End: 2017-04-10

## 2017-04-10 VITALS
HEIGHT: 62 IN | OXYGEN SATURATION: 98 % | SYSTOLIC BLOOD PRESSURE: 132 MMHG | WEIGHT: 158.63 LBS | HEART RATE: 75 BPM | TEMPERATURE: 99 F | BODY MASS INDEX: 29.19 KG/M2 | DIASTOLIC BLOOD PRESSURE: 70 MMHG | RESPIRATION RATE: 16 BRPM

## 2017-04-10 VITALS
HEART RATE: 90 BPM | SYSTOLIC BLOOD PRESSURE: 122 MMHG | RESPIRATION RATE: 16 BRPM | HEIGHT: 62 IN | DIASTOLIC BLOOD PRESSURE: 86 MMHG | WEIGHT: 156 LBS | BODY MASS INDEX: 28.71 KG/M2

## 2017-04-10 DIAGNOSIS — I10 ESSENTIAL HYPERTENSION, BENIGN: ICD-10-CM

## 2017-04-10 DIAGNOSIS — I74.9 EMBOLIC INFARCTION (HCC): Primary | ICD-10-CM

## 2017-04-10 DIAGNOSIS — M25.561 CHRONIC PAIN OF BOTH KNEES: ICD-10-CM

## 2017-04-10 DIAGNOSIS — E11.9 CONTROLLED TYPE 2 DIABETES MELLITUS WITHOUT COMPLICATION, WITHOUT LONG-TERM CURRENT USE OF INSULIN (HCC): ICD-10-CM

## 2017-04-10 DIAGNOSIS — M25.562 CHRONIC PAIN OF BOTH KNEES: ICD-10-CM

## 2017-04-10 DIAGNOSIS — G89.29 CHRONIC PAIN OF BOTH KNEES: ICD-10-CM

## 2017-04-10 DIAGNOSIS — M17.0 PRIMARY OSTEOARTHRITIS OF BOTH KNEES: ICD-10-CM

## 2017-04-10 DIAGNOSIS — I66.01 MIDDLE CEREBRAL ARTERY EMBOLISM, RIGHT: ICD-10-CM

## 2017-04-10 DIAGNOSIS — I63.9 ACUTE ISCHEMIC STROKE (HCC): Primary | ICD-10-CM

## 2017-04-10 DIAGNOSIS — I48.20 CHRONIC ATRIAL FIBRILLATION (HCC): ICD-10-CM

## 2017-04-10 DIAGNOSIS — R59.0 MEDIASTINAL LYMPHADENOPATHY: ICD-10-CM

## 2017-04-10 PROCEDURE — 99214 OFFICE O/P EST MOD 30 MIN: CPT | Performed by: OTHER

## 2017-04-10 PROCEDURE — 99214 OFFICE O/P EST MOD 30 MIN: CPT | Performed by: FAMILY MEDICINE

## 2017-04-10 RX ORDER — APIXABAN 5 MG/1
5 TABLET, FILM COATED ORAL 2 TIMES DAILY
Refills: 0 | COMMUNITY
Start: 2017-03-06 | End: 2017-07-18

## 2017-04-10 RX ORDER — POTASSIUM CHLORIDE 20 MEQ/1
TABLET, EXTENDED RELEASE ORAL
Refills: 3 | COMMUNITY
Start: 2017-03-27 | End: 2017-07-18

## 2017-04-10 NOTE — PROGRESS NOTES
Nazario Payan IS A 79year old female [address] [facility] Patient presents with:  Hospital F/U: F/U stroke 3/21/17-3/24/17       History of present illness:     Had hospitalization for CVA in March 21-22.  Saw Dr. Rj Wylie today and was told her other strokes may b Disp:  Rfl: 0   Potassium Chloride ER 20 MEQ Oral Tab CR TK 1 T PO QD Disp:  Rfl: 3   AMLODIPINE BESYLATE 5 MG Oral Tab TAKE 1 TABLET(5 MG) BY MOUTH EVERY DAY Disp: 90 tablet Rfl: 0   METOPROLOL SUCCINATE ER 50 MG Oral Tablet 24 Hr TAKE 1 TABLET(50 MG) BY bilateral knee pain, L>R, wonders if she can get injections in the knees.      Exam:     /70 mmHg  Pulse 75  Temp(Src) 98.5 °F (36.9 °C) (Oral)  Resp 16  Ht 62\"  Wt 158 lb 9.6 oz  BMI 29.00 kg/m2  SpO2 98%     Thyroid normal, no adenopathy, no caroti knees    Mediastinal lymphadenopathy          Patient Instructions   Add therapy for knees for strengthening both knees for mild osteoarthritis, recommend avoiding orthopedic visit or consideration of joint injection for at least 2 more months due to recen

## 2017-04-10 NOTE — PATIENT INSTRUCTIONS
Refill policies:    • Allow 2 business days for refills; controlled substances may take longer.   • Contact your pharmacy at least 5 days prior to running out of medication and have them send an electronic request or submit request through the “request re insurance carrier to obtain pre-certification or prior authorization. Unfortunately, [facility] has seen an increase in denial of payment even though the procedure/test has been pre-certified.   You are strongly encouraged to contact your insurance carrier to v

## 2017-04-10 NOTE — PATIENT INSTRUCTIONS
Add therapy for knees for strengthening both knees for mild osteoarthritis, recommend avoiding orthopedic visit or consideration of joint injection for at least 2 more months due to recent stroke, since sometimes it's better to be off blood thinners for a

## 2017-04-10 NOTE — PROGRESS NOTES
HPI:    Patient ID: Domi Yung is a 79year old female. HPI    Ms Alexandrea Alfaro is a 79year old female with history of Afib, diabetes, hypertension who presented for right MCA embolic infarction s/p mechanical thromboembolectomy.  She presented to the E negative.            Current Outpatient Prescriptions:  ELIQUIS 5 MG Oral Tab  Disp:  Rfl: 0   Potassium Chloride ER 20 MEQ Oral Tab CR TK 1 T PO QD Disp:  Rfl: 3   AMLODIPINE BESYLATE 5 MG Oral Tab TAKE 1 TABLET(5 MG) BY MOUTH EVERY DAY Disp: 90 tablet Rfl intact. Cranial nerves:   II, III, IV, VI :Pupils round, equal and reactive to light  and accommodation bilaterally. Extraocular muscle intact. Visual fields intact - ?  Left superior quadrantanopia  V: Normal facial sensation   VII: Face is symmetric w intracranial hemorrhage.   5. Pulmonary fibrotic changes are noted in both upper lobes.             ASSESSMENT/PLAN:   Embolic infarction (hcc)  (primary encounter diagnosis)  Middle cerebral artery embolism, right    Patient with right MCA embolic infarcts

## 2017-04-11 NOTE — TELEPHONE ENCOUNTER
Call radiology re: timing of repeat CT or workup for mediastinal adenopathy, accessibility of nodes to bronch, pulmonary fibrotic changes, consult recommendation/timing. Have staff contact pt to schedule followup for AWV and DM.  Also call Juan Luis Sánchez re: [facility]

## 2017-04-12 ENCOUNTER — TELEPHONE (OUTPATIENT)
Dept: FAMILY MEDICINE CLINIC | Facility: CLINIC | Age: 67
End: 2017-04-12

## 2017-04-12 DIAGNOSIS — R59.0 MEDIASTINAL LYMPHADENOPATHY: Primary | ICD-10-CM

## 2017-04-12 RX ORDER — SIMVASTATIN 10 MG
10 TABLET ORAL DAILY
Qty: 90 TABLET | Refills: 1 | Status: SHIPPED | OUTPATIENT
Start: 2017-04-12 | End: 2017-06-22

## 2017-04-12 NOTE — TELEPHONE ENCOUNTER
Pt discussed with Dr. Crystal Johns, upper chest was imaged on her CTA head & neck & pt should have a full CT of chest with contrast to determine how significant her enlarged lymph nodes are in the chest.     Patient informed she will need to schedule CT of ramos of CT of chest.    We can try to get her scheduled for the CT scan and for a followup visit with me before my vacation at end of the month.

## 2017-04-12 NOTE — TELEPHONE ENCOUNTER
Called central scheduling they will schedule the CT for the patient.      Future Appointments  Date Time Provider Binu Barnes   4/13/2017 1:00 PM Fred Stone [facility] [place]   4/19/2017 1:45 PM [facility] CT MAIN RM4 [facility] CT [place]

## 2017-04-12 NOTE — TELEPHONE ENCOUNTER
Please call patient needs appointment. After April 21  can give SDA. Need to see her after CT is done.

## 2017-04-12 NOTE — TELEPHONE ENCOUNTER
Called patient to schedule follow up visit after CT. Patient states she needs a Tuesday or Wednesday afternoon appointment for her ride.   Future Appointments  Date Time Provider Binu Barnes   4/13/2017 1:00 PM Anil, [address]

## 2017-04-13 ENCOUNTER — DIABETIC EDUCATION (OUTPATIENT)
Dept: ENDOCRINOLOGY CLINIC | Facility: CLINIC | Age: 67
End: 2017-04-13

## 2017-04-13 VITALS — HEIGHT: 62 IN | WEIGHT: 158 LBS | BODY MASS INDEX: 29.08 KG/M2

## 2017-04-13 DIAGNOSIS — E11.65 TYPE 2 DIABETES MELLITUS WITH HYPERGLYCEMIA, WITHOUT LONG-TERM CURRENT USE OF INSULIN (HCC): Primary | ICD-10-CM

## 2017-04-13 PROCEDURE — 97802 MEDICAL NUTRITION INDIV IN: CPT | Performed by: DIETITIAN, REGISTERED

## 2017-04-13 NOTE — PROGRESS NOTES
Gina Sofia  HND[birth year] was seen for Diabetic Medical Nutrition Therapy:    Date: 4/13/2017  Start time: 1:00       End time: 2:00     Assessment: Ht 62\"  Wt 158 lb  BMI 28.89 kg/m2    HGBA1C (%)   Date Value   03/22/2017 9.2*   06/17/2014 8.4*   --

## 2017-04-18 ENCOUNTER — TELEPHONE (OUTPATIENT)
Dept: FAMILY MEDICINE CLINIC | Facility: CLINIC | Age: 67
End: 2017-04-18

## 2017-04-18 NOTE — TELEPHONE ENCOUNTER
Spoke to patient and gave information as to the reason she needs the CT of the chest.  Says she understands.

## 2017-04-18 NOTE — TELEPHONE ENCOUNTER
Patient requests to speak with Dr Bg Latham nurse. I could not understand what she's needing - I asked several times. Transferred to triage line.

## 2017-04-19 ENCOUNTER — TELEPHONE (OUTPATIENT)
Dept: FAMILY MEDICINE CLINIC | Facility: CLINIC | Age: 67
End: 2017-04-19

## 2017-04-19 ENCOUNTER — HOSPITAL ENCOUNTER (OUTPATIENT)
Dept: CT IMAGING | Facility: HOSPITAL | Age: 67
Discharge: HOME | End: 2017-04-19
Attending: FAMILY MEDICINE
Payer: MEDICARE

## 2017-04-19 DIAGNOSIS — R59.0 MEDIASTINAL LYMPHADENOPATHY: ICD-10-CM

## 2017-04-19 PROCEDURE — 71260 CT THORAX DX C+: CPT

## 2017-04-19 NOTE — TELEPHONE ENCOUNTER
3/24 ov notes Ho coley [phone]    Antonino Phillips [phone]    No office visit 3/24 ov informed Lesly Hodges. No further need for fax.

## 2017-04-20 ENCOUNTER — OFFICE VISIT (OUTPATIENT)
Dept: SURGERY | Facility: CLINIC | Age: 67
End: 2017-04-20

## 2017-04-20 VITALS
HEART RATE: 84 BPM | DIASTOLIC BLOOD PRESSURE: 94 MMHG | WEIGHT: 164 LBS | RESPIRATION RATE: 18 BRPM | SYSTOLIC BLOOD PRESSURE: 140 MMHG | BODY MASS INDEX: 30.18 KG/M2 | HEIGHT: 62 IN

## 2017-04-20 DIAGNOSIS — R59.0 THORACIC LYMPHADENOPATHY: ICD-10-CM

## 2017-04-20 DIAGNOSIS — J84.10 PULMONARY FIBROSIS DETERMINED BY HIGH RESOLUTION COMPUTED TOMOGRAPHY (HCC): Primary | ICD-10-CM

## 2017-04-20 DIAGNOSIS — I66.01 MIDDLE CEREBRAL ARTERY EMBOLISM, RIGHT: Primary | ICD-10-CM

## 2017-04-20 PROCEDURE — 99215 OFFICE O/P EST HI 40 MIN: CPT

## 2017-04-20 NOTE — PROGRESS NOTES
[facility]  Interventional Neuroradiology Clinic Note    Charis Kanner, M.D. Stroke Neurology    Violetta Oppenheim, M.D. Primary Care    Matteo Carpenter M.D.   Cardiology      Date of Service: 4/20/2017    Dear Familia Marquez,     We had the pleasure of see the patient notes transient mild to severe daily headaches several times a day, lasting in duration of 15-20 minutes, but without associated nausea, photophobia, or phonophobia.   She describes chronic occasional blurred vision presumably related to her ronnie each by Other route 2 (two) times daily. , Disp: 200 each, Rfl: 1  •  Bioflavonoid Products (BIOFLEX OR), Take 1 tablet by mouth daily. , Disp: , Rfl:   •  Blood Gluc Meter Disp-Strips Does not apply Device, 200 Test strips, Disp: 200 Device, Rfl: 1  •  acet discharge  to suggest pseudoaneurysm or infection. The patient's NIH stroke scale is 0 and mRS 0.     Assessment/Plan:  79year old female with multiple cerebrovascular risk factors including diabetes mellitus type 2, hypertension, and atrial fibrillatio or alcohol/drug abuse.      Since the patient has made a complete neurological recovery, we informed her that her 3-month clinical follow-up evaluation post-ischemic stroke can be performed in conjunction with Dr. Amber Donnelly of the Stroke Neurology service fo

## 2017-04-20 NOTE — PROGRESS NOTES
Review of Systems:    Hand Dominance: right  General: chills  Neuro: no symptoms reported  Head: headache  Musculoskeletal: joint pain- both knees  Cardiovascular: she has a fib  Gastrointestinal: no symptoms reported  Genitourinary: no symptoms reported

## 2017-04-24 ENCOUNTER — MED REC SCAN ONLY (OUTPATIENT)
Dept: FAMILY MEDICINE CLINIC | Facility: CLINIC | Age: 67
End: 2017-04-24

## 2017-04-25 ENCOUNTER — OFFICE VISIT (OUTPATIENT)
Dept: FAMILY MEDICINE CLINIC | Facility: CLINIC | Age: 67
End: 2017-04-25

## 2017-04-25 VITALS
OXYGEN SATURATION: 98 % | DIASTOLIC BLOOD PRESSURE: 71 MMHG | HEIGHT: 62 IN | TEMPERATURE: 98 F | BODY MASS INDEX: 28.93 KG/M2 | RESPIRATION RATE: 16 BRPM | HEART RATE: 76 BPM | WEIGHT: 157.19 LBS | SYSTOLIC BLOOD PRESSURE: 136 MMHG

## 2017-04-25 DIAGNOSIS — Z12.31 SCREENING MAMMOGRAM, ENCOUNTER FOR: ICD-10-CM

## 2017-04-25 DIAGNOSIS — IMO0001 UNCONTROLLED TYPE 2 DIABETES MELLITUS WITHOUT COMPLICATION, WITHOUT LONG-TERM CURRENT USE OF INSULIN: ICD-10-CM

## 2017-04-25 DIAGNOSIS — R59.0 LYMPHADENOPATHY, AXILLARY: Primary | ICD-10-CM

## 2017-04-25 DIAGNOSIS — R59.0 LYMPHADENOPATHY, MEDIASTINAL: ICD-10-CM

## 2017-04-25 PROCEDURE — 99214 OFFICE O/P EST MOD 30 MIN: CPT | Performed by: FAMILY MEDICINE

## 2017-04-25 RX ORDER — METFORMIN HYDROCHLORIDE 500 MG/1
500 TABLET, EXTENDED RELEASE ORAL
Qty: 30 TABLET | Refills: 1 | Status: SHIPPED | OUTPATIENT
Start: 2017-04-25 | End: 2017-07-01

## 2017-04-25 NOTE — PROGRESS NOTES
Romaine Nieto IS A 79year old female HERE FOR Patient presents with:  Test Results: F/U on chest CT  and pt states simvastatin is causing her to urinate excessively at night and keeping her from sleeping       History of present illness:     Daughter i tablet (25 mg total) by mouth once daily. Disp: 90 tablet Rfl: 1   ONETOUCH DELICA LANCETS FINE Does not apply Misc 1 each by Other route 2 (two) times daily. Disp: 200 each Rfl: 1   Bioflavonoid Products (BIOFLEX OR) Take 1 tablet by mouth daily.  Disp:  R today that she does not want a biopsy due to potential for bleeding (had a lot of bleeding in past with a skin bx on leg). So we will plan repeat CT for now.  Patient told me the same, and that is reasonable since she is still on anticoagulation and stroke axilla image 44, 2.5 x 1.7 CM. This area was not included on the previous scan. Subcentimeter subpectoral nodes on the right are stable. There are tiny left axillary lymph nodes. Heterogeneous attenuation of the    thyroid gland without specific mass.  No p

## 2017-04-25 NOTE — PATIENT INSTRUCTIONS
Try taking simvastatin in the morning. For diabetes, add metformin 500 mg daily with dinner. You have enlarged lymph nodes in the chest and under the arm. These can be from different reasons, some benign (not cancer) but there is a chance of cancer.

## 2017-04-26 NOTE — PROGRESS NOTES
Also discussed cologard for colon ca screening in pt, with daughter. Will consider at [facility]

## 2017-05-01 ENCOUNTER — TELEPHONE (OUTPATIENT)
Dept: FAMILY MEDICINE CLINIC | Facility: CLINIC | Age: 67
End: 2017-05-01

## 2017-05-08 RX ORDER — LOSARTAN POTASSIUM 25 MG/1
TABLET ORAL
Qty: 90 TABLET | Refills: 0 | Status: SHIPPED | OUTPATIENT
Start: 2017-05-08 | End: 2017-07-18

## 2017-05-08 NOTE — TELEPHONE ENCOUNTER
Future Appointments    LOV 4/17    LAST LAB 3/17    LAST RX   Losartan Potassium 25 MG Oral Tab 90 tablet 1 8/29/2016       PROTOCOL  Hypertension Medications Protocol Passed     Refilled x 3 months.

## 2017-05-09 ENCOUNTER — PRIOR ORIGINAL RECORDS (OUTPATIENT)
Dept: OTHER | Age: 67
End: 2017-05-09

## 2017-05-10 ENCOUNTER — OFFICE VISIT (OUTPATIENT)
Dept: NEUROLOGY | Facility: CLINIC | Age: 67
End: 2017-05-10

## 2017-05-10 VITALS
WEIGHT: 158 LBS | HEART RATE: 88 BPM | DIASTOLIC BLOOD PRESSURE: 80 MMHG | RESPIRATION RATE: 18 BRPM | SYSTOLIC BLOOD PRESSURE: 130 MMHG | BODY MASS INDEX: 29 KG/M2

## 2017-05-10 DIAGNOSIS — I74.9 EMBOLIC INFARCTION (HCC): ICD-10-CM

## 2017-05-10 DIAGNOSIS — R51.9 HEADACHE, UNSPECIFIED HEADACHE TYPE: Primary | ICD-10-CM

## 2017-05-10 PROCEDURE — 99213 OFFICE O/P EST LOW 20 MIN: CPT | Performed by: OTHER

## 2017-05-10 NOTE — PROGRESS NOTES
HPI:    Patient ID: Sunny Espinosa is a 79year old female. HPI      Ms Haroldo Gomez is a 79year old female with history of Afib, diabetes, hypertension who presented for right MCA embolic infarction s/p mechanical thromboembolectomy.  She had a remarkable Take 1 tablet (500 mg total) by mouth daily with dinner. Disp: 30 tablet Rfl: 1   simvastatin 10 MG Oral Tab Take 1 tablet (10 mg total) by mouth daily. Disp: 90 tablet Rfl: 1   ELIQUIS 5 MG Oral Tab 5 mg 2 (two) times daily.    Disp:  Rfl: 0   Potassium Ch repetition and naming. Memory is intact. Cranial nerves:   II, III, IV, VI :Pupils round, equal and reactive to light  and accommodation bilaterally. Extraocular muscle intact. Visual fields intact - ?  Left superior quadrantanopia  V: Normal facial sen again identified. 4. No acute intracranial hemorrhage.   5. Pulmonary fibrotic changes are noted in both upper lobes.             ASSESSMENT/PLAN:   Headache, unspecified headache type  (primary encounter diagnosis)  Embolic infarction (hcc)    Right MCA i

## 2017-05-11 ENCOUNTER — TELEPHONE (OUTPATIENT)
Dept: NEUROLOGY | Facility: CLINIC | Age: 67
End: 2017-05-11

## 2017-05-11 NOTE — TELEPHONE ENCOUNTER
Authorization #[id number] at [facility] exp 7-11-17    Called patient gave her above including centralized scheduling phone #    Call reference #[id number].  Time on call 28:37

## 2017-05-17 LAB
BUN: 10 MG/DL
CALCIUM: 9.2 MG/DL
CHLORIDE: 102 MEQ/L
CHOLESTEROL, TOTAL: 157 MG/DL
CREATININE, SERUM: 0.86 MG/DL
GLUCOSE: 170 MG/DL
HDL CHOLESTEROL: 50 MG/DL
HEMATOCRIT: 35.2 %
HEMOGLOBIN A1C: 9.2 %
HEMOGLOBIN: 11.4 G/DL
LDL CHOLESTEROL: 79 MG/DL
PLATELETS: 191 K/UL
POTASSIUM, SERUM: 3.7 MEQ/L
RED BLOOD COUNT: 4.11 X 10-6/U
SODIUM: 137 MEQ/L
THYROID STIMULATING HORMONE: 2.56 MLU/L
TRIGLYCERIDES: 140 MG/DL
WHITE BLOOD COUNT: 9.2 X 10-3/U

## 2017-05-30 ENCOUNTER — TELEPHONE (OUTPATIENT)
Dept: FAMILY MEDICINE CLINIC | Facility: CLINIC | Age: 67
End: 2017-05-30

## 2017-05-30 NOTE — TELEPHONE ENCOUNTER
Ok to stop simvastatin until followup appointment.  Her doctor daughter was going to make an AWV appointment for her. (apparently scheduled in July)

## 2017-05-30 NOTE — TELEPHONE ENCOUNTER
Pt started Simvastatin about 2 weeks ago    Notes itching starting a few days later, now muscle pain in upper thigh, forearms up to elbows  Pt asked pharmacist about it and they told her she should call her Dr and probably stop the medication    Also gave

## 2017-05-30 NOTE — TELEPHONE ENCOUNTER
Left msg on front vm. Has questions about medication and chest X-ray. (sounded like)  Tried calling back to clarify and Full VM. Could not leave a msg. Did ask for a return call.

## 2017-05-30 NOTE — TELEPHONE ENCOUNTER
Pt informed  Future Appointments  Date Time Provider Binu Barnes   6/1/2017 2:45 PM [address] MAIN RM4 [address] [facility]   6/15/2017 11:20 AM Alyssa Persaud [address]   6/15/2017 1:00 PM MD RICHARD Back [address] [place]   7/18/2017 12:30 P

## 2017-06-01 ENCOUNTER — HOSPITAL ENCOUNTER (OUTPATIENT)
Dept: CT IMAGING | Facility: HOSPITAL | Age: 67
Discharge: HOME | End: 2017-06-01
Attending: Other
Payer: MEDICARE

## 2017-06-01 DIAGNOSIS — R51.9 HEADACHE, UNSPECIFIED HEADACHE TYPE: ICD-10-CM

## 2017-06-01 DIAGNOSIS — I74.9 EMBOLIC INFARCTION (HCC): ICD-10-CM

## 2017-06-01 PROCEDURE — 70450 CT HEAD/BRAIN W/O DYE: CPT | Performed by: OTHER

## 2017-06-02 ENCOUNTER — TELEPHONE (OUTPATIENT)
Dept: NEUROLOGY | Facility: CLINIC | Age: 67
End: 2017-06-02

## 2017-06-02 NOTE — TELEPHONE ENCOUNTER
Attempted to call both home and cell. No mailbox set up, could not LMTCB. Will need to try again later.

## 2017-06-05 RX ORDER — AMLODIPINE BESYLATE 5 MG/1
TABLET ORAL
Qty: 90 TABLET | Refills: 0 | Status: SHIPPED | OUTPATIENT
Start: 2017-06-05 | End: 2017-07-18

## 2017-06-05 NOTE — TELEPHONE ENCOUNTER
Future Appointments  Date Time Provider Binu Barnes   [facility]15[facility]2017 11:20 [facility] [address]   6/15/2017 1:00 PM Lisa Davidson MD [facility] [address] [place]   7/18/2017 12:30 PM Lyudmila Lakhani MD EM[facility] [address]     LOV 4/17    LAST LAB

## 2017-06-05 NOTE — TELEPHONE ENCOUNTER
Relayed test results to patient. Patient states [place] is moving to [place] and will no longer be seeing Dr. Consuelo Farley. Recommended patient to follow up with another neurologist to continue care and monitoring.      Informed patient I will notify the PSR to ca

## 2017-06-08 RX ORDER — METOPROLOL SUCCINATE 50 MG/1
TABLET, EXTENDED RELEASE ORAL
Qty: 90 TABLET | Refills: 0 | Status: SHIPPED | OUTPATIENT
Start: 2017-06-08 | End: 2017-07-18

## 2017-06-08 NOTE — TELEPHONE ENCOUNTER
Future Appointments  Date Time Provider Binu Canoi   6/15/2017 1:00 PM Lisa Loyd MD [facility] REECE [facility] [address]   7/18/2017 12:30 PM Guevara Watt MD EM[facility] [address]       LOV 4/17    LAST LAB 3/17    LAST RX  METOPROLOL SUCCINATE ER 50 MG Oral

## 2017-06-15 PROCEDURE — 86255 FLUORESCENT ANTIBODY SCREEN: CPT | Performed by: INTERNAL MEDICINE

## 2017-06-15 PROCEDURE — 86200 CCP ANTIBODY: CPT | Performed by: INTERNAL MEDICINE

## 2017-06-15 PROCEDURE — 83876 ASSAY MYELOPEROXIDASE: CPT | Performed by: INTERNAL MEDICINE

## 2017-06-15 PROCEDURE — 83516 IMMUNOASSAY NONANTIBODY: CPT | Performed by: INTERNAL MEDICINE

## 2017-06-28 ENCOUNTER — APPOINTMENT (OUTPATIENT)
Dept: LAB | Age: 67
End: 2017-06-28
Payer: MEDICARE

## 2017-06-28 DIAGNOSIS — R93.89 ABNORMAL CT OF THE CHEST: ICD-10-CM

## 2017-06-28 LAB
BUN BLD-MCNC: 12 MG/DL (ref 8–20)
CALCIUM BLD-MCNC: 9.1 MG/DL (ref 8.3–10.3)
CHLORIDE: 104 MMOL/L (ref 101–111)
CO2: 28 MMOL/L (ref 22–32)
CREAT BLD-MCNC: 0.89 MG/DL (ref 0.55–1.02)
GLUCOSE BLD-MCNC: 169 MG/DL (ref 70–99)
POTASSIUM SERPL-SCNC: 3.7 MMOL/L (ref 3.6–5.1)
SODIUM SERPL-SCNC: 140 MMOL/L (ref 136–144)

## 2017-06-28 PROCEDURE — 80048 BASIC METABOLIC PNL TOTAL CA: CPT

## 2017-06-28 PROCEDURE — 36415 COLL VENOUS BLD VENIPUNCTURE: CPT

## 2017-06-28 PROCEDURE — 93010 ELECTROCARDIOGRAM REPORT: CPT | Performed by: INTERNAL MEDICINE

## 2017-06-28 PROCEDURE — 93005 ELECTROCARDIOGRAM TRACING: CPT

## 2017-06-29 ENCOUNTER — ANESTHESIA EVENT (OUTPATIENT)
Dept: ENDOSCOPY | Facility: HOSPITAL | Age: 67
End: 2017-06-29

## 2017-06-30 ENCOUNTER — PRIOR ORIGINAL RECORDS (OUTPATIENT)
Dept: OTHER | Age: 67
End: 2017-06-30

## 2017-07-04 NOTE — TELEPHONE ENCOUNTER
Diabetic Medication Protocol Failed7/1 9:48 AM   Last HgBA1C < 7.5    HgBA1C procedure resulted in past 6 months    Microalbumin procedure in past 12 months or taking ACE/ARB    Appointment in past 6 or next 3 months     Future Appointments  Date Time Prov

## 2017-07-06 ENCOUNTER — ANESTHESIA (OUTPATIENT)
Dept: ENDOSCOPY | Facility: HOSPITAL | Age: 67
End: 2017-07-06

## 2017-07-06 RX ORDER — METFORMIN HYDROCHLORIDE 500 MG/1
TABLET, EXTENDED RELEASE ORAL
Qty: 30 TABLET | Refills: 0 | Status: SHIPPED | OUTPATIENT
Start: 2017-07-06 | End: 2017-07-18

## 2017-07-13 ENCOUNTER — SURGERY (OUTPATIENT)
Age: 67
End: 2017-07-13

## 2017-07-13 ENCOUNTER — HOSPITAL ENCOUNTER (OUTPATIENT)
Facility: HOSPITAL | Age: 67
Setting detail: HOSPITAL OUTPATIENT SURGERY
Discharge: HOME | End: 2017-07-13
Attending: INTERNAL MEDICINE | Admitting: INTERNAL MEDICINE
Payer: MEDICARE

## 2017-07-13 ENCOUNTER — APPOINTMENT (OUTPATIENT)
Dept: GENERAL RADIOLOGY | Facility: HOSPITAL | Age: 67
End: 2017-07-13
Attending: INTERNAL MEDICINE
Payer: MEDICARE

## 2017-07-13 VITALS
SYSTOLIC BLOOD PRESSURE: 137 MMHG | TEMPERATURE: 98 F | WEIGHT: 160 LBS | HEART RATE: 72 BPM | BODY MASS INDEX: 29.44 KG/M2 | OXYGEN SATURATION: 95 % | DIASTOLIC BLOOD PRESSURE: 79 MMHG | HEIGHT: 62 IN | RESPIRATION RATE: 20 BRPM

## 2017-07-13 DIAGNOSIS — R93.89 ABNORMAL CT OF THE CHEST: Primary | ICD-10-CM

## 2017-07-13 LAB
BASOPHIL BRONCHIAL WASHING: 0 %
EOSINOPHIL BRONCHIAL WASHING: 3 %
GLUCOSE BLD-MCNC: 140 MG/DL (ref 65–99)
GLUCOSE BLD-MCNC: 156 MG/DL (ref 65–99)
LYMPHOCYTE BRONCHIAL WASHING: 26 %
MON/MACROPHAGE BRONCHIAL WASH: 68 %
NEUTROPHILS BRONCHIAL WASHING: 3 %
RBC BRONCHIAL WASHING: 302 /MM3
TOTAL CELLS COUNTED: 100
WBC BRONCHIAL WASHING: 188 /MM3

## 2017-07-13 PROCEDURE — 88185 FLOWCYTOMETRY/TC ADD-ON: CPT | Performed by: INTERNAL MEDICINE

## 2017-07-13 PROCEDURE — 88305 TISSUE EXAM BY PATHOLOGIST: CPT | Performed by: INTERNAL MEDICINE

## 2017-07-13 PROCEDURE — 87116 MYCOBACTERIA CULTURE: CPT | Performed by: INTERNAL MEDICINE

## 2017-07-13 PROCEDURE — 87102 FUNGUS ISOLATION CULTURE: CPT | Performed by: INTERNAL MEDICINE

## 2017-07-13 PROCEDURE — 88312 SPECIAL STAINS GROUP 1: CPT | Performed by: INTERNAL MEDICINE

## 2017-07-13 PROCEDURE — 89050 BODY FLUID CELL COUNT: CPT | Performed by: INTERNAL MEDICINE

## 2017-07-13 PROCEDURE — 87798 DETECT AGENT NOS DNA AMP: CPT | Performed by: INTERNAL MEDICINE

## 2017-07-13 PROCEDURE — 87205 SMEAR GRAM STAIN: CPT | Performed by: INTERNAL MEDICINE

## 2017-07-13 PROCEDURE — 07B74ZX EXCISION OF THORAX LYMPHATIC, PERCUTANEOUS ENDOSCOPIC APPROACH, DIAGNOSTIC: ICD-10-PCS | Performed by: INTERNAL MEDICINE

## 2017-07-13 PROCEDURE — 0BBF8ZX EXCISION OF RIGHT LOWER LUNG LOBE, VIA NATURAL OR ARTIFICIAL OPENING ENDOSCOPIC, DIAGNOSTIC: ICD-10-PCS | Performed by: INTERNAL MEDICINE

## 2017-07-13 PROCEDURE — 82962 GLUCOSE BLOOD TEST: CPT

## 2017-07-13 PROCEDURE — 88173 CYTOPATH EVAL FNA REPORT: CPT | Performed by: INTERNAL MEDICINE

## 2017-07-13 PROCEDURE — 87206 SMEAR FLUORESCENT/ACID STAI: CPT | Performed by: INTERNAL MEDICINE

## 2017-07-13 PROCEDURE — 88104 CYTOPATH FL NONGYN SMEARS: CPT | Performed by: INTERNAL MEDICINE

## 2017-07-13 PROCEDURE — 71010 XR CHEST AP PORTABLE  (CPT=71010): CPT | Performed by: INTERNAL MEDICINE

## 2017-07-13 PROCEDURE — 0B9D8ZX DRAINAGE OF RIGHT MIDDLE LUNG LOBE, VIA NATURAL OR ARTIFICIAL OPENING ENDOSCOPIC, DIAGNOSTIC: ICD-10-PCS | Performed by: INTERNAL MEDICINE

## 2017-07-13 PROCEDURE — 87070 CULTURE OTHR SPECIMN AEROBIC: CPT | Performed by: INTERNAL MEDICINE

## 2017-07-13 PROCEDURE — 88184 FLOWCYTOMETRY/ TC 1 MARKER: CPT | Performed by: INTERNAL MEDICINE

## 2017-07-13 PROCEDURE — 89051 BODY FLUID CELL COUNT: CPT | Performed by: INTERNAL MEDICINE

## 2017-07-13 RX ORDER — NALOXONE HYDROCHLORIDE 0.4 MG/ML
80 INJECTION, SOLUTION INTRAMUSCULAR; INTRAVENOUS; SUBCUTANEOUS AS NEEDED
Status: DISCONTINUED | OUTPATIENT
Start: 2017-07-13 | End: 2017-07-13

## 2017-07-13 RX ORDER — MIDAZOLAM HYDROCHLORIDE 1 MG/ML
1 INJECTION INTRAMUSCULAR; INTRAVENOUS EVERY 5 MIN PRN
Status: DISCONTINUED | OUTPATIENT
Start: 2017-07-13 | End: 2017-07-13

## 2017-07-13 RX ORDER — HYDROCODONE BITARTRATE AND ACETAMINOPHEN 5; 325 MG/1; MG/1
2 TABLET ORAL AS NEEDED
Status: DISCONTINUED | OUTPATIENT
Start: 2017-07-13 | End: 2017-07-13

## 2017-07-13 RX ORDER — ONDANSETRON 2 MG/ML
4 INJECTION INTRAMUSCULAR; INTRAVENOUS AS NEEDED
Status: DISCONTINUED | OUTPATIENT
Start: 2017-07-13 | End: 2017-07-13

## 2017-07-13 RX ORDER — HYDROCODONE BITARTRATE AND ACETAMINOPHEN 5; 325 MG/1; MG/1
1 TABLET ORAL AS NEEDED
Status: DISCONTINUED | OUTPATIENT
Start: 2017-07-13 | End: 2017-07-13

## 2017-07-13 RX ORDER — LABETALOL HYDROCHLORIDE 5 MG/ML
5 INJECTION, SOLUTION INTRAVENOUS EVERY 5 MIN PRN
Status: DISCONTINUED | OUTPATIENT
Start: 2017-07-13 | End: 2017-07-13

## 2017-07-13 RX ORDER — SODIUM CHLORIDE, SODIUM LACTATE, POTASSIUM CHLORIDE, CALCIUM CHLORIDE 600; 310; 30; 20 MG/100ML; MG/100ML; MG/100ML; MG/100ML
INJECTION, SOLUTION INTRAVENOUS CONTINUOUS
Status: DISCONTINUED | OUTPATIENT
Start: 2017-07-13 | End: 2017-07-13

## 2017-07-13 RX ORDER — DEXTROSE MONOHYDRATE 25 G/50ML
50 INJECTION, SOLUTION INTRAVENOUS
Status: DISCONTINUED | OUTPATIENT
Start: 2017-07-13 | End: 2017-07-13

## 2017-07-13 RX ORDER — HYDROMORPHONE HYDROCHLORIDE 1 MG/ML
0.4 INJECTION, SOLUTION INTRAMUSCULAR; INTRAVENOUS; SUBCUTANEOUS EVERY 5 MIN PRN
Status: DISCONTINUED | OUTPATIENT
Start: 2017-07-13 | End: 2017-07-13

## 2017-07-13 RX ORDER — DIPHENHYDRAMINE HYDROCHLORIDE 50 MG/ML
12.5 INJECTION INTRAMUSCULAR; INTRAVENOUS AS NEEDED
Status: DISCONTINUED | OUTPATIENT
Start: 2017-07-13 | End: 2017-07-13

## 2017-07-13 RX ORDER — METOCLOPRAMIDE HYDROCHLORIDE 5 MG/ML
10 INJECTION INTRAMUSCULAR; INTRAVENOUS AS NEEDED
Status: DISCONTINUED | OUTPATIENT
Start: 2017-07-13 | End: 2017-07-13

## 2017-07-13 RX ORDER — MEPERIDINE HYDROCHLORIDE 25 MG/ML
12.5 INJECTION INTRAMUSCULAR; INTRAVENOUS; SUBCUTANEOUS AS NEEDED
Status: DISCONTINUED | OUTPATIENT
Start: 2017-07-13 | End: 2017-07-13

## 2017-07-13 RX ORDER — INSULIN ASPART 100 [IU]/ML
INJECTION, SOLUTION INTRAVENOUS; SUBCUTANEOUS ONCE
Status: DISCONTINUED | OUTPATIENT
Start: 2017-07-13 | End: 2017-07-13

## 2017-07-13 NOTE — ANESTHESIA PREPROCEDURE EVALUATION
PRE-OP EVALUATION    Patient Name: Jimmie Velarde    Pre-op Diagnosis: ABNORMAL CT CHEST    Procedure(s):  ENDOBRONCHIAL ULTRASOUND GUIDED LYMPH NODE BIOPSY AND FLOW CYTOMETRY EVALUATION    Surgeon(s) and Role:     Yue Millan MD - Primary    Pre- Allergies: Pcn [Penicillins]; Pravastatin; Simvastatin      Anesthesia Evaluation        Anesthetic Complications           GI/Hepatic/Renal    Negative GI/hepatic/renal ROS. Cardiovascular      ECG reviewed.   Exercise yessenia aspiration, mouth/dental/airway injury, PONV explained. Understanding expressed as well as a wish to proceed.     Plan/risks discussed with: patient and child/children                Present on Admission:  **None**

## 2017-07-13 NOTE — H&P
Pulmonary / Critical Care H&P       NAME: Abdi Dale - ROOM: [facility]/ EN* - MRN: [id number] - Age: 79year old - :  1950    Date of Admission: 2017  7:10 AM  Admission Diagnosis: ABNORMAL CT CHEST      History of Present Illne MOUTH EVERY DAY Disp: 90 tablet Rfl: 0   LOSARTAN POTASSIUM 25 MG Oral Tab TAKE 1 TABLET(25 MG) BY MOUTH EVERY DAY Disp: 90 tablet Rfl: 0   [DISCONTINUED] MetFORMIN HCl  MG Oral Tablet 24 Hr Take 1 tablet (500 mg total) by mouth daily with dinner.  Carolina Garcia Ht 5' 2\" (1.575 m)   Wt 160 lb (72.6 kg)   SpO2 95%   BMI 29.26 kg/m²     General Appearance:    Alert, cooperative, no distress, appears stated age   Head:    Normocephalic, without obvious abnormality, atraumatic   Eyes:    PERRL, conjunctiva/corneas cl

## 2017-07-13 NOTE — ANESTHESIA POSTPROCEDURE EVALUATION
[address] [facility] Patient Status:  Hospital Outpatient Surgery   Age/Gender 79year old female MRN [id number]   Location [facility]. Attending Drena Hammans, MD   Hosp Day # 0 PCP Crissy Cifuentes MD       Anesthesia Post-

## 2017-07-13 NOTE — OPERATIVE REPORT
Bronchoscopy procedure report    Preop diagnosis: abnl ct  Postop diagnosis:  same  Procedure performed: Bronchoscopy, Diagnostic  Bronchoalveolar lavage, BAL  Transbronchial biopsy  TBNA with EBUS guidance    Sedation used: General anesthesia    Angela Downey

## 2017-07-14 LAB
CD19+CD10+: 0.3 %
CD19+CD5+: 0.4 %
CD19+KAPPA+: 66.8 %
CD19+LAMBDA+: 32.5 %
CD3+CD2+: 99.9 %
CD3+CD4+: 88.5 %
CD3+CD4+CD8+: 0.3 %
CD3+CD5+: 99.8 %
CD3+CD7+: 92.7 %
CD3+CD8+: 9.9 %
CD4+:CD8+ RATIO: 8.9
KAPPA:LAMBDA RATIO: 2.06

## 2017-07-18 ENCOUNTER — OFFICE VISIT (OUTPATIENT)
Dept: FAMILY MEDICINE CLINIC | Facility: CLINIC | Age: 67
End: 2017-07-18

## 2017-07-18 VITALS
HEART RATE: 80 BPM | HEIGHT: 63 IN | DIASTOLIC BLOOD PRESSURE: 82 MMHG | BODY MASS INDEX: 28.17 KG/M2 | OXYGEN SATURATION: 98 % | SYSTOLIC BLOOD PRESSURE: 130 MMHG | WEIGHT: 159 LBS

## 2017-07-18 DIAGNOSIS — Z00.00 ENCOUNTER FOR ANNUAL HEALTH EXAMINATION: Primary | ICD-10-CM

## 2017-07-18 DIAGNOSIS — Z79.01 CHRONIC ANTICOAGULATION: ICD-10-CM

## 2017-07-18 DIAGNOSIS — R59.0 LYMPHADENOPATHY, MEDIASTINAL: ICD-10-CM

## 2017-07-18 DIAGNOSIS — E11.65 TYPE 2 DIABETES MELLITUS WITH HYPERGLYCEMIA, WITHOUT LONG-TERM CURRENT USE OF INSULIN (HCC): ICD-10-CM

## 2017-07-18 DIAGNOSIS — I10 ESSENTIAL HYPERTENSION, BENIGN: ICD-10-CM

## 2017-07-18 DIAGNOSIS — I48.21 PERMANENT ATRIAL FIBRILLATION (HCC): ICD-10-CM

## 2017-07-18 DIAGNOSIS — J30.9 ALLERGIC RHINITIS, UNSPECIFIED CHRONICITY, UNSPECIFIED SEASONALITY, UNSPECIFIED TRIGGER: ICD-10-CM

## 2017-07-18 DIAGNOSIS — E87.6 HYPOKALEMIA: ICD-10-CM

## 2017-07-18 DIAGNOSIS — Z78.0 POSTMENOPAUSAL: ICD-10-CM

## 2017-07-18 LAB
GLUCOSE BLOOD: 219
TEST STRIP LOT #: NORMAL NUMERIC

## 2017-07-18 PROCEDURE — 82962 GLUCOSE BLOOD TEST: CPT | Performed by: FAMILY MEDICINE

## 2017-07-18 PROCEDURE — 99213 OFFICE O/P EST LOW 20 MIN: CPT | Performed by: FAMILY MEDICINE

## 2017-07-18 PROCEDURE — G0438 PPPS, INITIAL VISIT: HCPCS | Performed by: FAMILY MEDICINE

## 2017-07-18 RX ORDER — LOSARTAN POTASSIUM 25 MG/1
TABLET ORAL
Qty: 90 TABLET | Refills: 1 | Status: SHIPPED | OUTPATIENT
Start: 2017-07-18 | End: 2018-02-06

## 2017-07-18 RX ORDER — GLIPIZIDE 5 MG/1
5 TABLET, FILM COATED, EXTENDED RELEASE ORAL 2 TIMES DAILY
Qty: 180 TABLET | Refills: 1 | Status: SHIPPED | OUTPATIENT
Start: 2017-07-18 | End: 2017-09-12

## 2017-07-18 RX ORDER — AMLODIPINE BESYLATE 5 MG/1
TABLET ORAL
Qty: 90 TABLET | Refills: 1 | Status: SHIPPED | OUTPATIENT
Start: 2017-07-18 | End: 2018-02-06

## 2017-07-18 RX ORDER — METOPROLOL SUCCINATE 50 MG/1
TABLET, EXTENDED RELEASE ORAL
Qty: 90 TABLET | Refills: 1 | Status: SHIPPED | OUTPATIENT
Start: 2017-07-18 | End: 2018-02-06

## 2017-07-18 RX ORDER — FLUTICASONE PROPIONATE 50 MCG
2 SPRAY, SUSPENSION (ML) NASAL DAILY
Qty: 3 BOTTLE | Refills: 3 | Status: SHIPPED | OUTPATIENT
Start: 2017-07-18 | End: 2018-07-13

## 2017-07-18 RX ORDER — METFORMIN HYDROCHLORIDE 500 MG/1
TABLET, EXTENDED RELEASE ORAL
Qty: 180 TABLET | Refills: 1 | Status: SHIPPED | OUTPATIENT
Start: 2017-07-18 | End: 2017-09-12

## 2017-07-18 RX ORDER — APIXABAN 5 MG/1
5 TABLET, FILM COATED ORAL 2 TIMES DAILY
Qty: 180 TABLET | Refills: 1 | Status: SHIPPED | OUTPATIENT
Start: 2017-07-18 | End: 2018-02-02

## 2017-07-18 RX ORDER — POTASSIUM CHLORIDE 20 MEQ/1
TABLET, EXTENDED RELEASE ORAL
Qty: 90 TABLET | Refills: 1 | Status: ON HOLD | OUTPATIENT
Start: 2017-07-18 | End: 2017-09-29

## 2017-07-18 NOTE — PROGRESS NOTES
HPI:   Ray Rod is a 79year old female who presents for a Medicare Initial Annual Wellness visit (Once after 12 month Medicare anniversary) .     Here for well exam. Thought I was going to give her test results from her bronchoscopy, I explained th 03/22/2017   HGB 11.4 (L) 03/22/2017   .0 03/22/2017        ALLERGIES:   She is allergic to pcn [penicillins]; pravastatin; and simvastatin.     CURRENT MEDICATIONS:     Outpatient Prescriptions Marked as Taking for the 7/18/17 encounter (Office Visi unusual skin lesions   EYES: needs ophtho exam. blurred vision or double vision  HEENT: denies nasal congestion, sinus pain or ST  LUNGS: denies shortness of breath with exertion, had a little cough with blood after bronch  CARDIOVASCULAR: denies chest kellee tenderness   Throat: Lips, mucosa, and tongue normal; teeth and gums normal   Neck: Supple, symmetrical, trachea midline, no adenopathy;  thyroid: not enlarged, symmetric, no tenderness/mass/nodules; no carotid bruit or JVD   Back:   Symmetric, no curvatur hyperglycemia, without long-term current use of insulin (HCC)  -     OPHTHALMOLOGY - INTERNAL  -     OFFICE/OUTPT VISIT,EST,LEVL III  -     MetFORMIN HCl  MG Oral Tablet 24 Hr; 1 tablet twice daily  -     GlipiZIDE ER 5 MG Oral Tablet 24 Hr;  Take 1 t PLAN:  The patient indicates understanding of these issues and agrees to the plan. No Follow-up on file.      Nilesh Leigh MD, 7/18/2017     General Health     In the past six months, have you lost more than 10 pounds without trying?: 2 - No    Has weeks)?: Not at all    Feeling down, depressed, or hopeless (over the last two weeks)?: Not at all    PHQ-2 SCORE: 0        Advance Directives     Do you have a healthcare power of ?: No    Do you have a living will?: No     Please go to \"[employer] flowsheet data found.      Repeat soon    Fasting Blood Sugar (FSB)   Patient must be diagnosed with one of the following:   • Hypertension   • Dyslipidemia   • Obesity (BMI ³30 kg/m2)   • Previous elevated impaired FBS or GTT   … or any two of the followin abnormal Colonoscopy,10 Years due on 01/01/2000 Update [facility] if applicable    Flex Sigmoidoscopy Screen  Covered every 5 years No results found for this or any previous visit. No flowsheet data found.      Fecal Occult Blood   Covered Annually Orders placed or performed in visit on 10/18/13  -INFLUENZA VIRUS VACCINE, PRESERV FREE, >=1YEARS OF AGE    Please get every year    Pneumococcal 13 (Prevnar)  Covered Once after 65 No orders found for this or any previous visit.  Please get once after y Directives.     Template: CRI[employer]HI[employer] VELEZ MEDICARE ANNUAL ASSESSMENT FEMALE [[id number]]

## 2017-07-18 NOTE — PATIENT INSTRUCTIONS
Need to get diabetic lab tests done in near future, I put in orders for them in April to do in July. Change how you are taking medicine:  Take metformin and glipizide together in morning and again metformin and glipizide at night with dinner. (1/2 hour be annually or at 6-month intervals for prediabetic patients   Repeat soon     Cardiovascular Disease Screening     Cholesterol, covered every 5 yrs including Total, LDL and Trigs LDL Cholesterol Calc (mg/dL)   Date Value   04/30/2013 99     LDL CHOLESTROL (m Last Dilated Eye Exam 11/24/2014    OK to schedule if you are in this risk group, make sure you have a referral    Schedule soon, check who is in network   Bone Density Screening      Bone density screening   Covered every 2 yrs after age 72    Covered y or IX concentrates   Clients of institutions for the mentally retarded   Persons who live in the same house as a HepB virus carrier   Homosexual men   Illicit injectable drug abusers     Tetanus Toxoid- Only covered with a cut with metal- TD and TDaP Not c

## 2017-07-22 ENCOUNTER — TELEPHONE (OUTPATIENT)
Dept: FAMILY MEDICINE CLINIC | Facility: CLINIC | Age: 67
End: 2017-07-22

## 2017-07-22 NOTE — TELEPHONE ENCOUNTER
Reviewed pulmonary chart, they feel she has sarcoidosis and are starting her on Prednisone 40 mg daily. Please tell patient to plan to increase both of her medicines, as we had discussed she should do after repeating diabetes tests.  Prednisone will definit

## 2017-08-14 ENCOUNTER — PATIENT OUTREACH (OUTPATIENT)
Dept: CASE MANAGEMENT | Age: 67
End: 2017-08-14

## 2017-08-14 ENCOUNTER — TELEPHONE (OUTPATIENT)
Dept: FAMILY MEDICINE CLINIC | Facility: CLINIC | Age: 67
End: 2017-08-14

## 2017-08-14 NOTE — TELEPHONE ENCOUNTER
Patient states she has too much heartburn and too much digestion problems. She would like to speak with the nurse. She has tried Pepcid but it's not working. She would like to know what she needs to do.

## 2017-08-14 NOTE — TELEPHONE ENCOUNTER
Please call pt to schedule an appt with Dr Galen Blue to see her to evalaute her discomfort.     Unfortunately the nurses can't recommend any additional treatment

## 2017-08-14 NOTE — PROGRESS NOTES
Spoke with pt to intro CCM. Pt stated at this time she is not interested but still requesting info in mail. Please send CCM info to Pt.

## 2017-08-15 RX ORDER — LANSOPRAZOLE 30 MG/1
30 CAPSULE, DELAYED RELEASE ORAL
Qty: 30 CAPSULE | Refills: 0 | Status: SHIPPED | OUTPATIENT
Start: 2017-08-15 | End: 2017-09-29

## 2017-08-15 NOTE — TELEPHONE ENCOUNTER
We should really see her. She could try lansoprazole 30 mg daily (we could send a prescription for it) until she can get an appointment. This is a strong acid blocking medicine. I sent a prescription for 30 days.  I'm not sure if it's covered on her insuran

## 2017-08-15 NOTE — TELEPHONE ENCOUNTER
I called the patient and explained that the nurse cannot give any information but we would be happy to get her scheduled with Dr Lexi Wyman.   She states she cannot come in - she can talk to someone on the phone, but cannot  anything from the pharmacy un

## 2017-08-15 NOTE — TELEPHONE ENCOUNTER
Left message for patient to call back. Advised Rx was sent. Needs to be seen.      Future Appointments  Date Time Provider Binu Barnes   9/14/2017 10:00 AM [facility] [address]   9/14/2017 10:45 AM Alayna Magana MD [facility] [address] [facility]

## 2017-08-21 ENCOUNTER — TELEPHONE (OUTPATIENT)
Dept: FAMILY MEDICINE CLINIC | Facility: CLINIC | Age: 67
End: 2017-08-21

## 2017-08-21 NOTE — TELEPHONE ENCOUNTER
Future Appointments  Date Time Provider Binu Canoi   8/23/2017 1:00 PM Markel Cantu MD [facility] [address]   9/14/2017 10:00 AM Edison RAMOS [address] [place]   9/14/2017 10:45 AM Markel Cantu MD [facility] [address] [place]       Needs DM appointm

## 2017-08-21 NOTE — TELEPHONE ENCOUNTER
**LEFT VM AT FRONT OFFICE**    LVM for a nurse to call and speak to her about her Diabetes. Pls call.

## 2017-08-21 NOTE — TELEPHONE ENCOUNTER
Called and said she just spoke to someone. Saying she has already done the labs and asking clinical questions. Transferred to Triage .

## 2017-08-23 ENCOUNTER — APPOINTMENT (OUTPATIENT)
Dept: LAB | Age: 67
End: 2017-08-23
Attending: FAMILY MEDICINE
Payer: MEDICARE

## 2017-08-23 DIAGNOSIS — E11.9 CONTROLLED TYPE 2 DIABETES MELLITUS WITHOUT COMPLICATION, WITHOUT LONG-TERM CURRENT USE OF INSULIN (HCC): ICD-10-CM

## 2017-08-23 LAB
ALBUMIN SERPL-MCNC: 2.9 G/DL (ref 3.5–4.8)
ALP LIVER SERPL-CCNC: 137 U/L (ref 55–142)
ALT SERPL-CCNC: 32 U/L (ref 14–54)
AST SERPL-CCNC: 18 U/L (ref 15–41)
BILIRUB SERPL-MCNC: 0.5 MG/DL (ref 0.1–2)
BUN BLD-MCNC: 18 MG/DL (ref 8–20)
CALCIUM BLD-MCNC: 9.1 MG/DL (ref 8.3–10.3)
CHLORIDE: 99 MMOL/L (ref 101–111)
CHOLEST SMN-MCNC: 219 MG/DL (ref ?–200)
CO2: 29 MMOL/L (ref 22–32)
CREAT BLD-MCNC: 1.06 MG/DL (ref 0.55–1.02)
CREAT UR-SCNC: 98.3 MG/DL
EST. AVERAGE GLUCOSE BLD GHB EST-MCNC: 295 MG/DL (ref 68–126)
GLUCOSE BLD-MCNC: 194 MG/DL (ref 70–99)
HBA1C MFR BLD HPLC: 11.9 % (ref ?–5.7)
HDLC SERPL-MCNC: 91 MG/DL (ref 45–?)
HDLC SERPL: 2.41 {RATIO} (ref ?–4.44)
LDLC SERPL CALC-MCNC: 79 MG/DL (ref ?–130)
LDLC SERPL-MCNC: 49 MG/DL (ref 5–40)
M PROTEIN MFR SERPL ELPH: 6.9 G/DL (ref 6.1–8.3)
MICROALBUMIN UR-MCNC: 12.9 MG/DL
MICROALBUMIN/CREAT 24H UR-RTO: 131.2 UG/MG (ref ?–30)
NONHDLC SERPL-MCNC: 128 MG/DL (ref ?–130)
POTASSIUM SERPL-SCNC: 3.4 MMOL/L (ref 3.6–5.1)
SODIUM SERPL-SCNC: 135 MMOL/L (ref 136–144)
TRIGLYCERIDES: 243 MG/DL (ref ?–150)

## 2017-08-23 PROCEDURE — 82043 UR ALBUMIN QUANTITATIVE: CPT | Performed by: FAMILY MEDICINE

## 2017-08-23 PROCEDURE — 83036 HEMOGLOBIN GLYCOSYLATED A1C: CPT | Performed by: FAMILY MEDICINE

## 2017-08-23 PROCEDURE — 80053 COMPREHEN METABOLIC PANEL: CPT | Performed by: FAMILY MEDICINE

## 2017-08-23 PROCEDURE — 82570 ASSAY OF URINE CREATININE: CPT | Performed by: FAMILY MEDICINE

## 2017-08-23 PROCEDURE — 80061 LIPID PANEL: CPT | Performed by: FAMILY MEDICINE

## 2017-08-23 PROCEDURE — 36415 COLL VENOUS BLD VENIPUNCTURE: CPT | Performed by: FAMILY MEDICINE

## 2017-08-24 ENCOUNTER — TELEPHONE (OUTPATIENT)
Dept: FAMILY MEDICINE CLINIC | Facility: CLINIC | Age: 67
End: 2017-08-24

## 2017-08-24 DIAGNOSIS — E11.65 UNCONTROLLED TYPE 2 DIABETES MELLITUS WITH COMPLICATION, WITHOUT LONG-TERM CURRENT USE OF INSULIN (HCC): Primary | ICD-10-CM

## 2017-08-24 DIAGNOSIS — E11.8 UNCONTROLLED TYPE 2 DIABETES MELLITUS WITH COMPLICATION, WITHOUT LONG-TERM CURRENT USE OF INSULIN (HCC): Primary | ICD-10-CM

## 2017-08-24 NOTE — TELEPHONE ENCOUNTER
She has a local daughter, and a daughter who moved to [place] who is a physician.  Please at least try to talk to the patient to explain her diabetes definitely needs insulin at least while she's on steroids and tell her the 2 options: a diabetes specialist or

## 2017-08-24 NOTE — TELEPHONE ENCOUNTER
May share details about health with individuals listed below.    Ernesto Keys [phone]    Left message for Asma to call back

## 2017-08-24 NOTE — TELEPHONE ENCOUNTER
----- Message from Anderson Saeed MD sent at 8/24/2017  9:43 AM CDT -----  A1c is up now to 11.9, she is on steroids for sarcoidosis that was diagnosed by pulmonary. I can't manage her diabetes with just pills at this point, she needs insulin.  Please in

## 2017-08-28 NOTE — TELEPHONE ENCOUNTER
Spoke with daughter Lauren Cutler who is the NP    She was expecting this results and would like mom to see both diabetic Edu and Endo    Referrals placed for both and phone #'s given to schedule

## 2017-08-31 NOTE — TELEPHONE ENCOUNTER
Needs to see  Dr Lonnie Baker. Angi Abbott M.D. Endocrinology, Diabetes and Metabolism  [facility] [address] [facility]  Phone: [phone]    Patient has very limited times for appointments.      Lizette patient cant get i

## 2017-09-05 ENCOUNTER — TELEPHONE (OUTPATIENT)
Dept: INTERNAL MEDICINE CLINIC | Facility: CLINIC | Age: 67
End: 2017-09-05

## 2017-09-05 ENCOUNTER — DIABETIC EDUCATION (OUTPATIENT)
Dept: ENDOCRINOLOGY CLINIC | Facility: CLINIC | Age: 67
End: 2017-09-05

## 2017-09-05 VITALS — BODY MASS INDEX: 28.52 KG/M2 | HEIGHT: 62 IN | WEIGHT: 155 LBS

## 2017-09-05 DIAGNOSIS — E11.65 UNCONTROLLED TYPE 2 DIABETES MELLITUS WITH HYPERGLYCEMIA, UNSPECIFIED LONG TERM INSULIN USE STATUS: Primary | ICD-10-CM

## 2017-09-05 DIAGNOSIS — E11.65 TYPE 2 DIABETES MELLITUS WITH HYPERGLYCEMIA, WITHOUT LONG-TERM CURRENT USE OF INSULIN (HCC): Primary | ICD-10-CM

## 2017-09-05 PROCEDURE — G0108 DIAB MANAGE TRN  PER INDIV: HCPCS | Performed by: DIETITIAN, REGISTERED

## 2017-09-05 NOTE — TELEPHONE ENCOUNTER
Herson Ruth, can you please reach out to this pt?  Dr. Joanne Baca would like to have her start insulin as soon as possible

## 2017-09-05 NOTE — TELEPHONE ENCOUNTER
Pt called back and stated she needs to start insulin \"right now\". \"she cannot wait. \"    Explained Dr Bianca Horta is not yet in the office and our Clinical Supervisor is aware of the phone notes. Pt would like a call back from us.

## 2017-09-05 NOTE — TELEPHONE ENCOUNTER
[de-identified], daughter, states her endo doctor cannot see her until October. Her A1C has jumped from 9 to 12 due to the prednisone. Daughter feels we should see her soon. Please advise. If we want to see her, daughter asks that we call patient to schedule.

## 2017-09-05 NOTE — TELEPHONE ENCOUNTER
Insulin requires personal instruction and dose advice. Usually people take one baseline dose daily (for basal needs) and then mealtime insulin based on sugars before meals.  It's complicated and not something I can do by phone, and not something I routinely

## 2017-09-05 NOTE — TELEPHONE ENCOUNTER
Pt to start novolog ss with set bolus to correct steroids and if high fasting add 10 units basaglar at night to see educator and see me after   Al[address]

## 2017-09-05 NOTE — TELEPHONE ENCOUNTER
Spoke with pt to schedule an appt in the Diabetic clinic with SC she stated she will call back in 10 minutes to schedule.

## 2017-09-05 NOTE — PROGRESS NOTES
Bina Cordero  : 1950 was seen for Injection Instruction:    Date: 2017   Start time: 4:00    End time: 4:30    Ht 62\"   Wt 155 lb   BMI 28.35 kg/m²           Glucose today: 213 mg/dl   type 2 diabetes    Current Medication:  Glipizide 5 mg;

## 2017-09-05 NOTE — TELEPHONE ENCOUNTER
Marcelino Negro, NP          9/5/17 1:09 PM   Note      Pt to start novolog ss with set bolus to correct steroids and if high fasting add 10 units basaglar at night to see educator and see me after   Stefany FARRAR,LEENAE                    9/5/17 10:54 AM

## 2017-09-06 ENCOUNTER — TELEPHONE (OUTPATIENT)
Dept: FAMILY MEDICINE CLINIC | Facility: CLINIC | Age: 67
End: 2017-09-06

## 2017-09-06 NOTE — TELEPHONE ENCOUNTER
Future Appointments  Date Time Provider Binu Molly   9/12/2017 1:15 PM Munira Grande NP [facility] [place]   9/14/2017 10:00 AM Jennifer Smiley [facility] [address] [place]   9/14/2017 10:45 AM Soren Fernandes MD [facility] [address]

## 2017-09-07 RX ORDER — METOPROLOL SUCCINATE 50 MG/1
TABLET, EXTENDED RELEASE ORAL
Qty: 90 TABLET | Refills: 0 | OUTPATIENT
Start: 2017-09-07

## 2017-09-07 RX ORDER — AMLODIPINE BESYLATE 5 MG/1
TABLET ORAL
Qty: 90 TABLET | Refills: 0 | OUTPATIENT
Start: 2017-09-07

## 2017-09-12 ENCOUNTER — TELEPHONE (OUTPATIENT)
Dept: INTERNAL MEDICINE CLINIC | Facility: CLINIC | Age: 67
End: 2017-09-12

## 2017-09-12 ENCOUNTER — OFFICE VISIT (OUTPATIENT)
Dept: ENDOCRINOLOGY CLINIC | Facility: CLINIC | Age: 67
End: 2017-09-12

## 2017-09-12 VITALS
HEIGHT: 62 IN | HEART RATE: 92 BPM | WEIGHT: 156 LBS | BODY MASS INDEX: 28.71 KG/M2 | DIASTOLIC BLOOD PRESSURE: 70 MMHG | SYSTOLIC BLOOD PRESSURE: 120 MMHG | RESPIRATION RATE: 18 BRPM | TEMPERATURE: 98 F

## 2017-09-12 DIAGNOSIS — E11.65 UNCONTROLLED TYPE 2 DIABETES MELLITUS WITH HYPERGLYCEMIA, UNSPECIFIED LONG TERM INSULIN USE STATUS: Primary | ICD-10-CM

## 2017-09-12 DIAGNOSIS — E11.65 TYPE 2 DIABETES MELLITUS WITH HYPERGLYCEMIA, WITHOUT LONG-TERM CURRENT USE OF INSULIN (HCC): ICD-10-CM

## 2017-09-12 PROCEDURE — 99214 OFFICE O/P EST MOD 30 MIN: CPT | Performed by: NURSE PRACTITIONER

## 2017-09-12 PROCEDURE — 95250 CONT GLUC MNTR PHYS/QHP EQP: CPT | Performed by: NURSE PRACTITIONER

## 2017-09-12 RX ORDER — METFORMIN HYDROCHLORIDE 500 MG/1
500 TABLET, EXTENDED RELEASE ORAL
Qty: 270 TABLET | Refills: 1 | COMMUNITY
Start: 2017-09-12 | End: 2018-01-03

## 2017-09-12 NOTE — PROGRESS NOTES
Alexandre Kelley  : 1950 was seen for Injection Instruction:    Date: 2017       /70 (BP Location: Right arm, Patient Position: Sitting, Cuff Size: adult)   Pulse 92   Temp 98.2 °F (36.8 °C) (Oral)   Resp 18   Ht 62\"   Wt 156 lb   Breast

## 2017-09-12 NOTE — PROGRESS NOTES
CC: Patient presents with:  Diabetes: new pt. referred by PCP. pt has meter at home.       HISTORY:  Past Medical History:   Diagnosis Date   • Arrhythmia     Afib: on Eloquis   • Atrial fibrillation ([facility] [address] 2003   • High blood pressure    • Stasis dermatitis none    Hyperlipidemia:  LDL:  79   Medication:  none  ROS:   Constitutional: Negative for fever, chills and fatigue. No distress. Eyes: Negative for visual disturbance.  Last eye exam needs  Respiratory: Negative for cough, chest tightness, shortness of week., Disp: 12 tablet, Rfl: 3  •  Losartan Potassium 25 MG Oral Tab, TAKE 1 TABLET(25 MG) BY MOUTH EVERY DAY, Disp: 90 tablet, Rfl: 1  •  AmLODIPine Besylate 5 MG Oral Tab, TAKE 1 TABLET(5 MG) BY MOUTH EVERY DAY, Disp: 90 tablet, Rfl: 1  •  Potassium Chlo to left mid-abdomen where she was injecting. Psychiatric: Normal mood and affect.      Diabetes foot exam: Bilateral barefoot skin diabetic exam is normal, visualized feet and the appearance is normal.  Bilateral monofilament/sensation of both feet is abn Sig: Use 4 times daily with  Insulin      ONETOUCH DELICA LANCETS FINE Does not apply Misc 300 each 1      Si each by Other route 3 (three) times daily.       Blood Gluc Meter Disp-Strips Does not apply Device 300 Device 3      Sig: Test 3 times daily

## 2017-09-12 NOTE — TELEPHONE ENCOUNTER
SC sent in rx today for Blood Gluc Meter Disp-Strips Does not apply Device and they need to know what type of meter she has?

## 2017-09-12 NOTE — PATIENT INSTRUCTIONS
Humalog take 4 units before each meal   Start Toujeo long insulin 15 units once a day every morning   Continue to test sugar 3 times a day before meals and bedtime   If sensor on arm falls off, place in [facility] and return   Return in 10-11 days with sensor

## 2017-09-19 ENCOUNTER — OFFICE VISIT (OUTPATIENT)
Dept: ENDOCRINOLOGY CLINIC | Facility: CLINIC | Age: 67
End: 2017-09-19

## 2017-09-19 VITALS
RESPIRATION RATE: 18 BRPM | DIASTOLIC BLOOD PRESSURE: 68 MMHG | SYSTOLIC BLOOD PRESSURE: 132 MMHG | TEMPERATURE: 98 F | BODY MASS INDEX: 28.71 KG/M2 | WEIGHT: 156 LBS | HEIGHT: 62 IN | HEART RATE: 92 BPM

## 2017-09-19 DIAGNOSIS — E11.65 TYPE 2 DIABETES MELLITUS WITH HYPERGLYCEMIA, WITHOUT LONG-TERM CURRENT USE OF INSULIN (HCC): ICD-10-CM

## 2017-09-19 DIAGNOSIS — E11.65 UNCONTROLLED TYPE 2 DIABETES MELLITUS WITH HYPERGLYCEMIA, UNSPECIFIED LONG TERM INSULIN USE STATUS: ICD-10-CM

## 2017-09-19 PROCEDURE — 95251 CONT GLUC MNTR ANALYSIS I&R: CPT | Performed by: NURSE PRACTITIONER

## 2017-09-19 PROCEDURE — 99213 OFFICE O/P EST LOW 20 MIN: CPT | Performed by: NURSE PRACTITIONER

## 2017-09-19 RX ORDER — GLIPIZIDE 5 MG/1
TABLET, FILM COATED, EXTENDED RELEASE ORAL
Refills: 1 | COMMUNITY
Start: 2017-07-18 | End: 2017-09-29

## 2017-09-19 NOTE — PROGRESS NOTES
9/19/17    Diabetes Education:    Josh Dykes of [employer] Sensor:    Target set at  mg/dl  7 day glucose average = 223 mg/dl     32% of readings within target         68% above target   0% below target  Estimated A1C = 9.4%    Noted pattern of highs during

## 2017-09-20 NOTE — PROGRESS NOTES
CC: Patient presents with:  Diabetes: follow up. pt did bring aamir and meter.       HISTORY:  Past Medical History:   Diagnosis Date   • Arrhythmia     Afib: on Eloquis   • Atrial fibrillation ([facility] [address] 2003   • High blood pressure    • Stasis dermatitis    • Medication: metoprolol, amlodipine, losartan  SE none    Hyperlipidemia:  LDL:  79   Medication:  none  ROS:   Constitutional: Negative for fever, chills and fatigue. No distress. Eyes: Negative for visual disturbance.  Last eye exam needs  Respiratory: N (three) times daily. , Disp: 300 each, Rfl: 1  •  Blood Gluc Meter Disp-Strips Does not apply Device, Test 3 times daily, Disp: 300 Device, Rfl: 3  •  nystatin [id number] UNIT/ML Mouth/Throat Suspension, Take 5 mL (500,000 Units total) by mouth 4 (four) times d lipodystrophy. Skin: Skin is warm and dry. No rash noted. No erythema. No pallor. No open wounds noted. No lipodystrophy. Psychiatric: Normal mood and affect.        Assessment and Plan:  Uncontrolled type 2 diabetes mellitus with hyperglycemia, unspecif

## 2017-09-21 ENCOUNTER — TELEPHONE (OUTPATIENT)
Dept: INTERNAL MEDICINE CLINIC | Facility: CLINIC | Age: 67
End: 2017-09-21

## 2017-09-21 NOTE — TELEPHONE ENCOUNTER
We received a letter from [facility]/medicare-medicaid plan indicating DM strips was denied. SC folder for review. Please advise. Thank you.

## 2017-09-22 ENCOUNTER — TELEPHONE (OUTPATIENT)
Dept: INTERNAL MEDICINE CLINIC | Facility: CLINIC | Age: 67
End: 2017-09-22

## 2017-09-22 NOTE — TELEPHONE ENCOUNTER
This has to be billed through part B not sure what we can do pharmacy needs to process through part B not D   Please call them thank you   Mily FARRAR,CDE

## 2017-09-22 NOTE — TELEPHONE ENCOUNTER
Paperwork indicates Ultra Blue Strips script should be submitted under Medicare Part B not D coverage. Faxed paperwork to Candida at [phone], confirmation received. Paperwork sent to scan.

## 2017-09-29 ENCOUNTER — APPOINTMENT (OUTPATIENT)
Dept: MRI IMAGING | Facility: HOSPITAL | Age: 67
DRG: 065 | End: 2017-09-29
Attending: Other
Payer: MEDICARE

## 2017-09-29 ENCOUNTER — APPOINTMENT (OUTPATIENT)
Dept: CT IMAGING | Facility: HOSPITAL | Age: 67
DRG: 065 | End: 2017-09-29
Attending: EMERGENCY MEDICINE
Payer: MEDICARE

## 2017-09-29 ENCOUNTER — APPOINTMENT (OUTPATIENT)
Dept: GENERAL RADIOLOGY | Facility: HOSPITAL | Age: 67
DRG: 065 | End: 2017-09-29
Attending: EMERGENCY MEDICINE
Payer: MEDICARE

## 2017-09-29 ENCOUNTER — HOSPITAL ENCOUNTER (INPATIENT)
Facility: HOSPITAL | Age: 67
LOS: 1 days | Discharge: HOME | DRG: 065 | End: 2017-10-01
Attending: EMERGENCY MEDICINE | Admitting: HOSPITALIST
Payer: MEDICARE

## 2017-09-29 DIAGNOSIS — G45.9 TRANSIENT CEREBRAL ISCHEMIA, UNSPECIFIED TYPE: Primary | ICD-10-CM

## 2017-09-29 DIAGNOSIS — E11.65 UNCONTROLLED TYPE 2 DIABETES MELLITUS WITH HYPERGLYCEMIA, UNSPECIFIED LONG TERM INSULIN USE STATUS: ICD-10-CM

## 2017-09-29 DIAGNOSIS — E11.65 TYPE 2 DIABETES MELLITUS WITH HYPERGLYCEMIA, WITHOUT LONG-TERM CURRENT USE OF INSULIN (HCC): ICD-10-CM

## 2017-09-29 PROCEDURE — 70498 CT ANGIOGRAPHY NECK: CPT | Performed by: EMERGENCY MEDICINE

## 2017-09-29 PROCEDURE — 99220 INITIAL OBSERVATION CARE,LEVL III: CPT | Performed by: HOSPITALIST

## 2017-09-29 PROCEDURE — 95819 EEG AWAKE AND ASLEEP: CPT | Performed by: OTHER

## 2017-09-29 PROCEDURE — 70551 MRI BRAIN STEM W/O DYE: CPT | Performed by: OTHER

## 2017-09-29 PROCEDURE — 99223 1ST HOSP IP/OBS HIGH 75: CPT | Performed by: OTHER

## 2017-09-29 PROCEDURE — 71010 XR CHEST AP PORTABLE  (CPT=71010): CPT | Performed by: EMERGENCY MEDICINE

## 2017-09-29 PROCEDURE — 70496 CT ANGIOGRAPHY HEAD: CPT | Performed by: EMERGENCY MEDICINE

## 2017-09-29 PROCEDURE — 70450 CT HEAD/BRAIN W/O DYE: CPT | Performed by: EMERGENCY MEDICINE

## 2017-09-29 RX ORDER — FAMOTIDINE 10 MG/ML
20 INJECTION, SOLUTION INTRAVENOUS DAILY
Status: DISCONTINUED | OUTPATIENT
Start: 2017-09-29 | End: 2017-10-01

## 2017-09-29 RX ORDER — SENNOSIDES 8.6 MG
17.2 TABLET ORAL NIGHTLY
Status: DISCONTINUED | OUTPATIENT
Start: 2017-09-29 | End: 2017-10-01

## 2017-09-29 RX ORDER — LABETALOL HYDROCHLORIDE 5 MG/ML
10 INJECTION, SOLUTION INTRAVENOUS EVERY 10 MIN PRN
Status: DISCONTINUED | OUTPATIENT
Start: 2017-09-29 | End: 2017-09-29

## 2017-09-29 RX ORDER — FAMOTIDINE 20 MG/1
20 TABLET ORAL DAILY
Status: DISCONTINUED | OUTPATIENT
Start: 2017-09-29 | End: 2017-09-29

## 2017-09-29 RX ORDER — FAMOTIDINE 10 MG/ML
20 INJECTION, SOLUTION INTRAVENOUS DAILY
Status: DISCONTINUED | OUTPATIENT
Start: 2017-09-29 | End: 2017-09-29

## 2017-09-29 RX ORDER — SODIUM CHLORIDE 9 MG/ML
INJECTION, SOLUTION INTRAVENOUS CONTINUOUS
Status: DISCONTINUED | OUTPATIENT
Start: 2017-09-29 | End: 2017-09-30

## 2017-09-29 RX ORDER — LABETALOL HYDROCHLORIDE 5 MG/ML
10 INJECTION, SOLUTION INTRAVENOUS EVERY 10 MIN PRN
Status: DISCONTINUED | OUTPATIENT
Start: 2017-09-29 | End: 2017-10-01

## 2017-09-29 RX ORDER — DEXTROSE MONOHYDRATE 25 G/50ML
50 INJECTION, SOLUTION INTRAVENOUS
Status: DISCONTINUED | OUTPATIENT
Start: 2017-09-29 | End: 2017-10-01

## 2017-09-29 RX ORDER — ACETAMINOPHEN 325 MG/1
650 TABLET ORAL EVERY 4 HOURS PRN
Status: DISCONTINUED | OUTPATIENT
Start: 2017-09-29 | End: 2017-09-29

## 2017-09-29 RX ORDER — ONDANSETRON 2 MG/ML
4 INJECTION INTRAMUSCULAR; INTRAVENOUS EVERY 6 HOURS PRN
Status: DISCONTINUED | OUTPATIENT
Start: 2017-09-29 | End: 2017-10-01

## 2017-09-29 RX ORDER — POLYETHYLENE GLYCOL 3350 17 G/17G
17 POWDER, FOR SOLUTION ORAL DAILY PRN
Status: DISCONTINUED | OUTPATIENT
Start: 2017-09-29 | End: 2017-10-01

## 2017-09-29 RX ORDER — ASPIRIN 81 MG/1
81 TABLET, CHEWABLE ORAL DAILY
Status: DISCONTINUED | OUTPATIENT
Start: 2017-09-30 | End: 2017-10-01

## 2017-09-29 RX ORDER — ONDANSETRON 2 MG/ML
4 INJECTION INTRAMUSCULAR; INTRAVENOUS EVERY 6 HOURS PRN
Status: DISCONTINUED | OUTPATIENT
Start: 2017-09-29 | End: 2017-09-29

## 2017-09-29 RX ORDER — PREDNISONE 20 MG/1
20 TABLET ORAL
Status: DISCONTINUED | OUTPATIENT
Start: 2017-09-30 | End: 2017-10-01

## 2017-09-29 RX ORDER — AMLODIPINE BESYLATE 5 MG/1
5 TABLET ORAL DAILY
Status: DISCONTINUED | OUTPATIENT
Start: 2017-09-29 | End: 2017-10-01

## 2017-09-29 RX ORDER — MORPHINE SULFATE 4 MG/ML
2 INJECTION, SOLUTION INTRAMUSCULAR; INTRAVENOUS EVERY 2 HOUR PRN
Status: DISCONTINUED | OUTPATIENT
Start: 2017-09-29 | End: 2017-09-30

## 2017-09-29 RX ORDER — SODIUM PHOSPHATE, DIBASIC AND SODIUM PHOSPHATE, MONOBASIC 7; 19 G/133ML; G/133ML
1 ENEMA RECTAL ONCE AS NEEDED
Status: DISCONTINUED | OUTPATIENT
Start: 2017-09-29 | End: 2017-10-01

## 2017-09-29 RX ORDER — BISACODYL 10 MG
10 SUPPOSITORY, RECTAL RECTAL
Status: DISCONTINUED | OUTPATIENT
Start: 2017-09-29 | End: 2017-10-01

## 2017-09-29 RX ORDER — SULFAMETHOXAZOLE AND TRIMETHOPRIM 800; 160 MG/1; MG/1
1 TABLET ORAL
Status: DISCONTINUED | OUTPATIENT
Start: 2017-09-29 | End: 2017-10-01

## 2017-09-29 RX ORDER — ACETAMINOPHEN 650 MG/1
650 SUPPOSITORY RECTAL EVERY 4 HOURS PRN
Status: DISCONTINUED | OUTPATIENT
Start: 2017-09-29 | End: 2017-09-29

## 2017-09-29 RX ORDER — FAMOTIDINE 20 MG/1
20 TABLET ORAL DAILY
Status: DISCONTINUED | OUTPATIENT
Start: 2017-09-29 | End: 2017-10-01

## 2017-09-29 RX ORDER — ACETAMINOPHEN 325 MG/1
650 TABLET ORAL EVERY 4 HOURS PRN
Status: DISCONTINUED | OUTPATIENT
Start: 2017-09-29 | End: 2017-10-01

## 2017-09-29 RX ORDER — METOCLOPRAMIDE HYDROCHLORIDE 5 MG/ML
10 INJECTION INTRAMUSCULAR; INTRAVENOUS EVERY 8 HOURS PRN
Status: DISCONTINUED | OUTPATIENT
Start: 2017-09-29 | End: 2017-10-01

## 2017-09-29 RX ORDER — PHENYLEPHRINE HCL IN 0.9% NACL 50MG/250ML
PLASTIC BAG, INJECTION (ML) INTRAVENOUS CONTINUOUS PRN
Status: DISCONTINUED | OUTPATIENT
Start: 2017-09-29 | End: 2017-09-30

## 2017-09-29 RX ORDER — DILTIAZEM HYDROCHLORIDE 5 MG/ML
10 INJECTION INTRAVENOUS ONCE
Status: COMPLETED | OUTPATIENT
Start: 2017-09-29 | End: 2017-09-29

## 2017-09-29 RX ORDER — PREDNISONE 20 MG/1
20 TABLET ORAL DAILY
COMMUNITY
End: 2018-02-06

## 2017-09-29 RX ORDER — METOPROLOL SUCCINATE 50 MG/1
50 TABLET, EXTENDED RELEASE ORAL
Status: DISCONTINUED | OUTPATIENT
Start: 2017-09-30 | End: 2017-10-01

## 2017-09-29 RX ORDER — ACETAMINOPHEN 650 MG/1
650 SUPPOSITORY RECTAL EVERY 4 HOURS PRN
Status: DISCONTINUED | OUTPATIENT
Start: 2017-09-29 | End: 2017-10-01

## 2017-09-29 RX ORDER — METOCLOPRAMIDE HYDROCHLORIDE 5 MG/ML
10 INJECTION INTRAMUSCULAR; INTRAVENOUS EVERY 8 HOURS PRN
Status: DISCONTINUED | OUTPATIENT
Start: 2017-09-29 | End: 2017-09-29

## 2017-09-29 RX ORDER — ASPIRIN 81 MG/1
324 TABLET, CHEWABLE ORAL ONCE
Status: COMPLETED | OUTPATIENT
Start: 2017-09-29 | End: 2017-09-29

## 2017-09-29 RX ORDER — ROPINIROLE 0.5 MG/1
0.5 TABLET, FILM COATED ORAL NIGHTLY PRN
Status: DISCONTINUED | OUTPATIENT
Start: 2017-09-29 | End: 2017-09-30

## 2017-09-29 RX ORDER — LANSOPRAZOLE 30 MG/1
30 CAPSULE, DELAYED RELEASE ORAL
COMMUNITY

## 2017-09-29 RX ORDER — ATORVASTATIN CALCIUM 80 MG/1
80 TABLET, FILM COATED ORAL NIGHTLY
Status: DISCONTINUED | OUTPATIENT
Start: 2017-09-29 | End: 2017-10-01

## 2017-09-29 RX ORDER — DOCUSATE SODIUM 100 MG/1
100 CAPSULE, LIQUID FILLED ORAL 2 TIMES DAILY
Status: DISCONTINUED | OUTPATIENT
Start: 2017-09-29 | End: 2017-10-01

## 2017-09-29 RX ORDER — LOSARTAN POTASSIUM 25 MG/1
25 TABLET ORAL DAILY
Status: DISCONTINUED | OUTPATIENT
Start: 2017-09-30 | End: 2017-10-01

## 2017-09-29 RX ORDER — MORPHINE SULFATE 4 MG/ML
1 INJECTION, SOLUTION INTRAMUSCULAR; INTRAVENOUS EVERY 2 HOUR PRN
Status: DISCONTINUED | OUTPATIENT
Start: 2017-09-29 | End: 2017-09-30

## 2017-09-29 NOTE — PROGRESS NOTES
09/29/17 1800   Clinical Encounter Type   Visited With Patient and family together  (SANA Vizcaino and small children at bedside.)   Continue Visiting No   Patient's Supportive Strategies/Resources ( provided emotional support for the patientand

## 2017-09-29 NOTE — PROGRESS NOTES
[id number] [facility] Preliminary Note    Romaine Nieto Patient Status:  Emergency    1950 MRN [id number]   Location [facility] Attending Matthias Wilkerson MD   Hosp Day # 0 PCP Estefanía Carpio MD     REASON FOR ANNE dermatitis    • Stroke [facility]) 3/21/17    Hospital 3/21-3/24/17 and has daily headaches   • Type II or unspecified type diabetes mellitus without mention of complication, not stated as uncontrolled 2007   • Unspecified essential hypertension 2003   • René Aguila into the skin daily. nystatin 259492 UNIT/ML Mouth/Throat Suspension Take 5 mL (500,000 Units total) by mouth 4 (four) times daily.    lansoprazole 30 MG Oral Capsule Delayed Release Take 1 capsule (30 mg total) by mouth every morning before breakfast. midline. Uvula and palate elevate symmetrically. Shrug shoulders -slightly weaker left shoulder. The rest of the cranial nerves are grossly intact. Sensation to light touch is intact bilaterally. Motor: No Drift. No arm or leg weakness noted. residual neurological deficits following her stroke and has been followed by Dr. Blaise Ferraro closely in clinic. Her symptoms are now mostly resolved except for a subjective tingling sensation on her tongue and slighly weaker hand grasp on the left.  Antwon yepez

## 2017-09-29 NOTE — H&P
[facility]  History and Physical     Counts include 234 beds at the [facility] Patient Status:  Emergency    1950 MRN [id number]   Location [facility] Attending Oren Jacob MD   Hosp Day # 0 PCP Violetta Oppenheim, MD     Chief Complaint: Breanna Sandoval [Penicillins]       Unknown    Medications:    No current facility-administered medications on file prior to encounter.    Current Outpatient Prescriptions on File Prior to Encounter:  Insulin Glargine (TOUJEO SOLOSTAR) 300 UNIT/ML Subcutaneous Solution Pen before breakfast. Disp: 30 capsule Rfl: 0   Fluticasone Propionate 50 MCG/ACT Nasal Suspension 2 sprays by Nasal route daily.  Disp: 3 Bottle Rfl: 3   Potassium Chloride ER 20 MEQ Oral Tab CR TK 1 T PO QD Disp: 90 tablet Rfl: 1   Bio[employer] (BIOF Epic.      ASSESSMENT / PLAN:     1. Left sided weakness with tongue tingling, resolved; history of cerebrovascular disease - RIGHT MCA CVA d/t thrombus sp thrombectomy 3/2017  1. Admit  2. Aspirin x 1 given, hold further Aspirin  3. MRI STAT  4.  Lipid in

## 2017-09-29 NOTE — CONSULTS
[facility]    Report of Consultation    Sergeidory Rondony Patient Status:  Observation    1950 MRN [id number]   [facility] 7NE-A Attending Liudmila Velarde MD   Hosp Day # 0 PCP Dunia Guan MD     Date of Admission:   Varicose veins      Past Surgical History:  2012: CATH ANGIO      Comment: small problem  No date:   Family History   Problem Relation Age of Onset   • Stroke Mother    • Stroke Sister       reports that she has never smoked.  She has never used smokele Continuous PRN  •  phenylephrine in NaCl (FAMILIA-SYNEPHRINE) 50 mg/250 ml premix infusion SOLN, 100-200 mcg/min, Intravenous, Continuous PRN  •  Senna (SENOKOT) tab TABS 17.2 mg, 17.2 mg, Oral, Nightly  •  docusate sodium (COLACE) cap 100 mg, 100 mg, Oral, BI rhythm. NEUROLOGICAL:  This patient is alert and orientated x 3. Speech fluent. Able to follow simple commands. Face is symmetrical.  No Drift. Pupils equally round and reactive to light. 2+ reflexes bilaterally. EOMs intact. Visual fields are full. unspecified type        Impression:  Acute onset left upper and lower extremity weakness  Episode of lightheadedness preceding above  History of right MCA stroke without residual deficit  Atrial fibrillation  HTN  DM    Discussion/recommendations:  She lik

## 2017-09-29 NOTE — PLAN OF CARE
Admitted patient from ED. Neuro assessment unremarkable. NIH:O on presentation. Stroke protocol initiated. EEG done. Awaiting MRI. Screening form sent. Family at bedside.  Pt passed bedside swallow eval. HR A fib 120's, IV cardizem given per [facility] with improv

## 2017-09-29 NOTE — HISTORICAL OFFICE NOTE
Kev Haider  : 1950  ACCOUNT:  [id number]  [id number]  PCP: Dr. Gin Stevens     TODAY'S DATE: 2017  DICTATED BY:  Nate Nunez M.D. ]    CHIEF COMPLAINT: [Followup of .  CAD, of native vessels, nonobstructive and Followup of Chronic atri stationary bike 3 X week. DIET: no special diet. MARITAL STATUS: . ALLERGIES: Penicillins - CLASS    MEDICATIONS: Selected prescriptions see below    CONS: well developed, well nourished. WEIGHT: BMI parameters reviewed and discussed.  HEAD/FACE: 03/05/15 AmLODIPine Besylate   5MG       1 tablet daily                           03/05/15 GlipiZIDE XL          5MG       1 & 1/2 tablets daily                    03/05/15 Losartan Potassium    25MG      1 tablet daily                           03

## 2017-09-29 NOTE — ED INITIAL ASSESSMENT (HPI)
Last seen normal @11am, per pt taking shower and then fell to floor, left sided weakness. Pt has hx of stroke 4-5 months ago. Pt on blood thinners. Pt denies loc or hitting head.

## 2017-09-29 NOTE — CONSULTS
[facility]  Report of Consultation    Romaine Nieto Patient Status:  Observation    1950 MRN [id number]   [facility] 7NE-A Attending Rere Menard MD   Hosp Day # 0 PCP Estefanía Carpio MD     Reason for Consultation:  Gallito Navarrete use drugs. She is a . She lives with her daughter.     Allergies:    Pcn [Penicillins]       Unknown    Medications:    Current Facility-Administered Medications:   •  AmLODIPine Besylate (NORVASC) tab 5 mg, 5 mg, Oral, Daily  •  apixaban (ELIQUIS) tab (MIRALAX) powder packet 17 g, 17 g, Oral, Daily PRN  •  magnesium hydroxide (MILK OF MAGNESIA) 400 MG/5ML suspension 30 mL, 30 mL, Oral, Daily PRN  •  bisacodyl (DULCOLAX) rectal suppository 10 mg, 10 mg, Rectal, Daily PRN  •  FLEET ENEMA (FLEET) 7-19 GM/1 normal, no murmur, rub or gallop. Lungs: Clear without wheezes, rales, rhonchi or dullness. Abdomen: Soft, non-tender. Extremities: Without clubbing, cyanosis or edema. Peripheral pulses are 2+. Neurologic: Alert and oriented, normal affect.   Skin: therapy. Rates controlled. Continue medical therapy. MRI of brain pending. Workup per neurology. I would favor addition of low dose aspirin to eliquis for additional stroke prophylaxis.   Needs more aggressive risk factor modification, including im

## 2017-09-30 ENCOUNTER — APPOINTMENT (OUTPATIENT)
Dept: CV DIAGNOSTICS | Facility: HOSPITAL | Age: 67
DRG: 065 | End: 2017-09-30
Attending: Other
Payer: MEDICARE

## 2017-09-30 PROBLEM — I63.9 ACUTE CVA (CEREBROVASCULAR ACCIDENT) (HCC): Status: ACTIVE | Noted: 2017-03-22

## 2017-09-30 PROCEDURE — 93306 TTE W/DOPPLER COMPLETE: CPT | Performed by: OTHER

## 2017-09-30 PROCEDURE — 99233 SBSQ HOSP IP/OBS HIGH 50: CPT | Performed by: OTHER

## 2017-09-30 PROCEDURE — 99233 SBSQ HOSP IP/OBS HIGH 50: CPT | Performed by: HOSPITALIST

## 2017-09-30 RX ORDER — POTASSIUM CHLORIDE 20 MEQ/1
40 TABLET, EXTENDED RELEASE ORAL EVERY 4 HOURS
Status: COMPLETED | OUTPATIENT
Start: 2017-09-30 | End: 2017-09-30

## 2017-09-30 NOTE — PROGRESS NOTES
[facility]  Progress Note    Tiffanie Cota Patient Status:  Observation    1950 MRN [id number]   [facility] 7NE-A Attending Raina Martin MD   Hosp Day # 0 PCP Modesta Herman MD       Assessment and Plan:  Patient Active Pr Unknown    Physical Exam:  Blood pressure 142/95, pulse 100, temperature 98.6 °F (37 °C), temperature source Oral, resp. rate 18, height 5' 6\" (1.676 m), weight 154 lb 5.2 oz (70 kg), SpO2 97 %, not currently breastfeeding.   General: Alert and oriented in Labetalol HCl (TRANDATE) injection 10 mg 10 mg Intravenous Q10 Min PRN   niCARdipine HCl in NaCl (CARDENE) 20 mg/200 ml premix infusion 5-15 mg/hr Intravenous Continuous PRN   Senna (SENOKOT) tab TABS 17.2 mg 17.2 mg Oral Nightly   docusate sodium (COLAC

## 2017-09-30 NOTE — PROGRESS NOTES
[facility]    Progress Note    Nazario Payan Patient Status:  Observation    1950 MRN [id number]   [facility] 7NE-A Attending Radha Longoria MD   Hosp Day # 0 PCP Piedad Dumont MD     Subjective:  Nazario Payan is a(n) Succinate ER (Toprol XL) 24 hr tab 50 mg 50 mg Oral Daily Beta Blocker   predniSONE (DELTASONE) tab 20 mg 20 mg Oral Daily with breakfast   Sulfamethoxazole-TMP DS (BACTRIM DS) 800-160 MG per tab 1 tablet 1 tablet Oral Once per day on Mon Wed Fri   lottie H and N- no stenosis, occlusion or dissection  - no consolidation, wnl  UA- no nitrites, no LE, no cells    Component      Latest Ref Rng & Units 9/30/2017 9/29/2017   CHOLESTEROL, TOTAL      <200 mg/dL 182    Triglycerides      <150 mg/dL 160 (H)    HD and recommended that while the diabetes from prednisone is being worked on, she should be started on a small dose ASA  Discussed increased risk of bleeding, however this it will help with secondary stroke risk reduction  Consider endocrinology consult for

## 2017-09-30 NOTE — PROGRESS NOTES
Only able to cover carbs for fruit cup for dinner that patient ordered here- the rest of dinner that patient ate is outside food that family brought in. Carbs unknown.  Patient educated on importance of ordering meals from our menu so that we can accurately

## 2017-09-30 NOTE — SLP NOTE
ADULT SWALLOWING EVALUATION    ASSESSMENT    ASSESSMENT/OVERALL IMPRESSION:  B/S swallow eval completed upon receipt of MD order.   Per MD note History of Present Illness: Nelly Nugent is a 79year old female with a history of Cerebrovascular disease - cerebral ischemia    Cerebrovascular accident (CVA) due to occlusion of right middle cerebral artery [facility])      Past Medical History  Past Medical History:   Diagnosis Date   • Arrhythmia     Afib: on Eloquis   • Atrial fibrillation ([facility] [address] 2003   • High blo possible esophageal involvement      Current status G Code: Initial, Swallowing, CH: 0% impaired  Goal status G Code: Swallowing, CH: 0% impaired    Thank you for your referral.   If you have any questions, please contact Ronald Roberts

## 2017-09-30 NOTE — PROCEDURES
[address]      PATIENT'S NAME: Keven Geo   ATTENDING PHYSICIAN: Erik Evans. SANDHYA Noe    PATIENT ACCOUNT #: [de-identified] LOCATION: [address] [place] [address]   MEDICAL RECORD #: [id number] DATE OF B dysfunction and high susceptibility of seizure focus in this region. Correlation with good imaging study may be helpful. Clinical correlation is indicated. There is much muscle artifact in bilateral frontocentral head region.       Dictated By Anel Magana,

## 2017-09-30 NOTE — PAYOR COMM NOTE
--------------  CONTINUED STAY REVIEW     9/300  MRI    Impression:     CONCLUSION:       There are 2 separate small foci of acute infarction in the posterior limb right internal capsule.     Sequelae of marked chronic small vessel ischemic disease is note

## 2017-09-30 NOTE — PLAN OF CARE
Assumed care of pt 1900. Aox4 Neuro checks q4hrs. Slight generalized weakness noted. A-fib per tele. HR 90-low 100's. 's with activity, does not sustain. Eliquis given as ordered. BP within ordered parameters. SCDs. Plan for echo.  IVF infusing as ord

## 2017-09-30 NOTE — PHYSICAL THERAPY NOTE
Orders for PT evaluation received. Pt remains on 24 hour bedrest at this time. Will follow-up as appropriate.

## 2017-09-30 NOTE — PAYOR COMM NOTE
--------------  ADMISSION REVIEW     Payor: RICHARD [facility]    9/29    ED       HPI     Patient is 49-year-old female, history of acute stroke status post thrombectomy in March of this year, here for evaluation of acute onset left-sided arm numbness an diabetes mellitus without mention of complication, not stated as uncontrolled 2007   • Unspecified essential hypertension 2003          Physical Exam   ED Triage Vitals [09/29/17 1218]  BP: 150/88  Pulse: 104  Resp: 16  Temp: (!) 97.1 °F (36.2 °C)  Temp sr is not a candidate for TPA. Given the findings in the  Last known well a code stroke read was called. Care was discussed with Dr. Gray Munoz.   Agrees that the patient is not a TPA candidate.        CTA appears improved from previous study with no large vessel

## 2017-09-30 NOTE — PLAN OF CARE
Assumed care at 0700. A&Ox4. Afib on tele, HR in 90s. 97% on Ra. IVF d/c. Family at bedside. K replaced. Levemir increased to 20 units. Novolog increased to 8 units with all meals. Denies pain or discomfort. Dr. Kristi Montes consulted.  Neuro checks Q4 per

## 2017-09-30 NOTE — PROGRESS NOTES
[facility]  Progress Note     Adriane Chang Patient Status:  Observation    1950 MRN [id number]   [facility] 7NE-A Attending Amaya Royal MD   Hosp Day # 0 PCP Amber Dey MD     Chief Complaint: Acute CVA    S: Qi Beta Blocker   • predniSONE  20 mg Oral Daily with breakfast   • Sulfamethoxazole-TMP DS  1 tablet Oral Once per day on Mon Wed Fri   • Senna  17.2 mg Oral Nightly   • docusate sodium  100 mg Oral BID   • famoTIDine  20 mg Oral Daily    Or   • famoTIDine

## 2017-10-01 ENCOUNTER — APPOINTMENT (OUTPATIENT)
Dept: ULTRASOUND IMAGING | Facility: HOSPITAL | Age: 67
DRG: 065 | End: 2017-10-01
Attending: INTERNAL MEDICINE
Payer: MEDICARE

## 2017-10-01 VITALS
SYSTOLIC BLOOD PRESSURE: 127 MMHG | DIASTOLIC BLOOD PRESSURE: 75 MMHG | OXYGEN SATURATION: 94 % | HEART RATE: 77 BPM | WEIGHT: 154.31 LBS | BODY MASS INDEX: 24.8 KG/M2 | RESPIRATION RATE: 19 BRPM | HEIGHT: 66 IN | TEMPERATURE: 98 F

## 2017-10-01 PROCEDURE — 93880 EXTRACRANIAL BILAT STUDY: CPT | Performed by: INTERNAL MEDICINE

## 2017-10-01 PROCEDURE — 99233 SBSQ HOSP IP/OBS HIGH 50: CPT | Performed by: OTHER

## 2017-10-01 PROCEDURE — 99239 HOSP IP/OBS DSCHRG MGMT >30: CPT | Performed by: HOSPITALIST

## 2017-10-01 RX ORDER — ASPIRIN 81 MG/1
81 TABLET, CHEWABLE ORAL DAILY
Qty: 30 TABLET | Refills: 11 | Status: SHIPPED | OUTPATIENT
Start: 2017-10-01

## 2017-10-01 RX ORDER — ATORVASTATIN CALCIUM 40 MG/1
40 TABLET, FILM COATED ORAL NIGHTLY
Qty: 30 TABLET | Refills: 11 | Status: SHIPPED | OUTPATIENT
Start: 2017-10-01 | End: 2018-03-19

## 2017-10-01 NOTE — PHYSICAL THERAPY NOTE
Attempted PT eval at this time, however Pt currently off floor at [address]. Will re-attempt later today as time allows.

## 2017-10-01 NOTE — DIETARY NOTE
Nutrition Short Note    Pt seen d/t HgbA1c of 11.1%. Pt states blood sugars have been high d/t steroids. Pt admits to eating a lot of rice and bread.   Pt provided with Diabetic Education including carb counting, portion sizes, label reading, and consisten

## 2017-10-01 NOTE — OCCUPATIONAL THERAPY NOTE
OCCUPATIONAL THERAPY QUICK EVALUATION - INPATIENT    Room Number: 4609/2998-A  Evaluation Date: 10/1/2017     Type of Evaluation: Quick Eval  Presenting Problem: Transient Cerebral Ischema    Physician Order: IP Consult to Occupational Therapy  Reason for knees  Management Techniques: Activity promotion; Body mechanics;Repositioning    COGNITION  WFL    RANGE OF MOTION AND STRENGTH ASSESSMENT  Upper extremity ROM is within functional limits     Upper extremity strength is within functional limits       ACTIV profile noted above. Functional outcome measures completed include The AM-DEREK ' '6-Clicks' Inpatient Daily Activity Short Form was completed and  this patient  is demonstrating a  15.86% degree impairment in activities of daily living.  Research supports th extremities: at previous functional level  Patient/Caregiver able to demonstrate safety with ADLS: at previous functional level

## 2017-10-01 NOTE — CM/SW NOTE
10/01/17 1200   CM/SW Screening   Referral [address] [facility] staff; Chart review   Patient's Mental Status Alert;Oriented   Patient's [address]   Number of Levels in Home 2   Number of Stair in Home 2 RADHA   Patient li

## 2017-10-01 NOTE — PLAN OF CARE
Assumed care @ 0700  A&Ox4, VSS on RA, afib on tele  L sided weakness noted. No further neuro deficits  U/s carotids completed        NURSING DISCHARGE NOTE    Discharged Home via Wheelchair.   Accompanied by Support staff  Belongings Taken by patient/famil

## 2017-10-01 NOTE — CONSULTS
[facility]      Endocrinology Consultation    Elle Pacheco Patient Status:  Inpatient    1950 MRN [id number]   [facility] 7NE-A Attending Franc Pacheco MD   Hosp Day # 1 PCP Anne Mason MD     Reason for Consultation: time   Sulfamethoxazole-TMP -160 MG Oral Tab per tablet Take 1 tablet by mouth 3 (three) times a week.  Disp: 12 tablet Rfl: 3 9/29/2017 at Unknown time   Glucose Blood (ONETOUCH ULTRA BLUE) In Vitro Strip Check 3 times daily Disp: 100 each Rfl: 0 Carlos • Stasis dermatitis    • Stroke [facility]) 3/21/17    Hospital 3/21-3/24/17 and has daily headaches   • Type II or unspecified type diabetes mellitus without mention of complication, not stated as uncontrolled 2007   • Unspecified essential hypertension 2003 sodium (COLACE) cap 100 mg, 100 mg, Oral, BID  •  PEG 3350 (MIRALAX) powder packet 17 g, 17 g, Oral, Daily PRN  •  magnesium hydroxide (MILK OF MAGNESIA) 400 MG/5ML suspension 30 mL, 30 mL, Oral, Daily PRN  •  bisacodyl (DULCOLAX) rectal suppository 10 mg, bilaterally  Psychiatric: oriented to person, place, time    Labs    Lab Results  Component Value Date   K 4.0 09/30/2017   PGLU 190 09/30/2017     Results for Vanessa Rowell (MRN [id number]) as of 10/1/2017 08:47   Ref.  Range 9/29/2017 12:17   HEMOGLOBI

## 2017-10-01 NOTE — PROGRESS NOTES
[facility]    Progress Note    Domi Reach Patient Status:  Inpatient    1950 MRN [id number]   [facility] 7NE-A Attending Rima Galvan MD   Hosp Day # 1 PCP Jonatan Yadav MD     Subjective:  Domi Reach is a(n) 6 CC   Insulin Aspart Pen (NOVOLOG) 100 UNIT/ML flexpen 1-30 Units 1-30 Units Subcutaneous TID CC and HS   AmLODIPine Besylate (NORVASC) tab 5 mg 5 mg Oral Daily   apixaban (ELIQUIS) tab 5 mg 5 mg Oral BID   Losartan Potassium (COZAAR) tab 25 mg 25 mg Oral D aspirin chewable tab 81 mg 81 mg Oral Daily       Labs:    Lab Results  Component Value Date   K 4.0 09/30/2017   PGLU 270 10/01/2017       Assessment:  Patient Active Problem List:     Foot pain     Type [facility] mellitus with [facility]     E

## 2017-10-01 NOTE — PLAN OF CARE
Assumed care. Remains stable neurologically. Neuro checks remain Q4hrs. Slept through the night. Will monitor.

## 2017-10-01 NOTE — PROGRESS NOTES
[facility] [address]  [place], [address]  ?  10/01/17  ? Re: Glorious Raffi  ? To Whom It May Concern:    Nelly Nugent was admitted to [facility] from 9/29/2017 to 10/01/17.     Patient will follow up with their primary ca

## 2017-10-01 NOTE — PROGRESS NOTES
[facility]  Progress Note     Lupillo Hodge Patient Status:  Observation    1950 MRN [id number]   [facility] 7NE-A Attending Bebo Kerr MD   Hosp Day # 1 PCP Alona Moore MD     Chief Complaint: Acute CVA     S: Wylene Cuff 20 Units Subcutaneous Nightly   • Insulin Aspart Pen  1-25 Units Subcutaneous TID CC   • Insulin Aspart Pen  1-30 Units Subcutaneous TID CC and HS   • AmLODIPine Besylate  5 mg Oral Daily   • apixaban  5 mg Oral BID   • Losartan Potassium  25 mg Oral Daily

## 2017-10-01 NOTE — PHYSICAL THERAPY NOTE
PHYSICAL THERAPY QUICK EVALUATION - INPATIENT    Room Number: 8744/1615-N  Evaluation Date: 10/1/2017  Presenting Problem: acute R internal capsule CVA  Physician Order: PT Eval and Treat    Problem List  Principal Problem:    Transient cerebral ischemia Lower extremity strength is within functional limits; 5/5     NEUROLOGICAL FINDINGS  Neurological Findings: Coordination - Heel to Cardenas;Sensation     Coordination - Heel to Shin: Symmetrical     Sensation: no deficits noted       AM-PAC '6-Clicks' INPA no residual deficits. Functional outcome measures completed include Modified Trinity and Modified Solis balance assessment, scores noted above.  Based on this evaluation, patient's clinical presentation is stable and overall evaluation complexity is consider

## 2017-10-02 ENCOUNTER — TELEPHONE (OUTPATIENT)
Dept: FAMILY MEDICINE CLINIC | Facility: CLINIC | Age: 67
End: 2017-10-02

## 2017-10-02 NOTE — TELEPHONE ENCOUNTER
Patient states she needs a HFU for stroke. I scheduled for the only time I could make it work with her schedule.     Future Appointments  Date Time Provider Binu Molly   10/3/2017 9:45 AM Manuel Castro NP [facility]   10/3/2017 5:30

## 2017-10-02 NOTE — DISCHARGE SUMMARY
[facility]  DISCHARGE SUMMARY     Joe Dale Patient Status:  Inpatient    1950 MRN [id number]   [facility] 7NE-A Attending No att. providers found   Hosp Day # 1 PCP Aaron Vivar MD     Date of Admission: 2017 to patient who complained of slurred speech and left sided weakness. BG 96. Patient brought to the ER, symptoms resolved. No chest pain or shortness of breath. No nausea, vomiting, diarrhea. No numbness, tingling, weakness. No double or blurred vision.  No TABLET(5 MG) BY MOUTH EVERY DAY   Quantity:  90 tablet  Refills:  1     BIOFLEX OR      Take 1 tablet by mouth daily.    Refills:  0     Blood Gluc Meter Disp-Strips Joy      Test 3 times daily   Quantity:  300 Device  Refills:  3     ELIQUIS 5 MG Tabs  [facility] [address]    Phone:  [phone]   · aspirin 81 MG Chew  · atorvastatin 40 MG Tabs  · Insulin Glargine 300 UNIT/ML Sopn  · Insulin Lispro 100 UNIT/ML Sopn         Follow-up appointment:   Win Lawson  [address]

## 2017-10-03 ENCOUNTER — PATIENT OUTREACH (OUTPATIENT)
Dept: CASE MANAGEMENT | Age: 67
End: 2017-10-03

## 2017-10-03 ENCOUNTER — OFFICE VISIT (OUTPATIENT)
Dept: FAMILY MEDICINE CLINIC | Facility: CLINIC | Age: 67
End: 2017-10-03

## 2017-10-03 ENCOUNTER — TELEPHONE (OUTPATIENT)
Dept: FAMILY MEDICINE CLINIC | Facility: CLINIC | Age: 67
End: 2017-10-03

## 2017-10-03 VITALS
BODY MASS INDEX: 29.11 KG/M2 | SYSTOLIC BLOOD PRESSURE: 110 MMHG | HEIGHT: 62 IN | WEIGHT: 158.19 LBS | RESPIRATION RATE: 16 BRPM | HEART RATE: 98 BPM | TEMPERATURE: 98 F | OXYGEN SATURATION: 98 % | DIASTOLIC BLOOD PRESSURE: 76 MMHG

## 2017-10-03 DIAGNOSIS — I63.9 ISCHEMIC STROKE (HCC): Primary | ICD-10-CM

## 2017-10-03 DIAGNOSIS — F19.20 CORTICOSTEROID DEPENDENCE (HCC): ICD-10-CM

## 2017-10-03 DIAGNOSIS — Z79.4 TYPE 2 DIABETES MELLITUS WITH COMPLICATION, WITH LONG-TERM CURRENT USE OF INSULIN (HCC): ICD-10-CM

## 2017-10-03 DIAGNOSIS — E11.8 TYPE 2 DIABETES MELLITUS WITH COMPLICATION, WITH LONG-TERM CURRENT USE OF INSULIN (HCC): ICD-10-CM

## 2017-10-03 DIAGNOSIS — D86.0 SARCOIDOSIS OF LUNG (HCC): ICD-10-CM

## 2017-10-03 DIAGNOSIS — Z02.9 ENCOUNTERS FOR ADMINISTRATIVE PURPOSE: ICD-10-CM

## 2017-10-03 DIAGNOSIS — I48.21 PERMANENT ATRIAL FIBRILLATION (HCC): ICD-10-CM

## 2017-10-03 PROCEDURE — 99495 TRANSJ CARE MGMT MOD F2F 14D: CPT | Performed by: FAMILY MEDICINE

## 2017-10-03 NOTE — PROGRESS NOTES
Initial Post Discharge Follow Up   Discharge Date: 10/1/17  Contact Date: 10/3/2017    Consent Verification:  Assessment Completed With: Patient  HIPAA Verified?   Yes    Discharge Dx:  Acute CVA    General:   • How have you been since your discharge fro Tab per tablet Take 1 tablet by mouth 3 (three) times a week.  Disp: 12 tablet Rfl: 3   Glucose Blood (ONETOUCH ULTRA BLUE) In Vitro Strip Check 3 times daily Disp: 100 each Rfl: 0   MetFORMIN HCl  MG Oral Tablet 24 Hr 500 mg 3 (three) times daily wit specifics:  Please enter following SmartPhrase as applicable:  AMI:  .TCMAMITEMP  CHF:  .TCMCHFTEMP  COPD: .TCMCOPDTEMP  CVA: .TCMCVATEMP  CV Surgery:  . TCMCVSURGERY  Pneumonia: . TCMPNEUMONIATEMP  Ortho/Spine:  TCMORTHOSPINETEMP  Re-admit:  . TCMREADMITTEMP scheduled at D/C within 7-14 days  yes     NCM Reviewed/scheduled/rescheduled PCP TCM/HFU appointment with pt:  Yes      Have you made all of your follow up appointments? no--pt aware she still needs to schedule HFU appt with cardio and neuro.      Is there

## 2017-10-03 NOTE — TELEPHONE ENCOUNTER
Spoke to pt for TCM today. Pt will be coming in for HFU appt this evening at 5:30pm.  Pt states L-sided numbness/tingling have completely resolved, however she is still having some residual LLE weakness.   Pt states PT/OT were not recommended at d/c, but s

## 2017-10-03 NOTE — PATIENT INSTRUCTIONS
Check with Gerardo, [facility] senior services, police or fire department, or [facility] of aging for recommendations on medical alert services.      I don't think you need physical therapy, strength is now normal.     Let Lizette know

## 2017-10-03 NOTE — PROGRESS NOTES
HPI:    Sunny Espinosa is a 79year old female here today for hospital follow up.    She was discharged from Inpatient hospital, [facility] to Home   Admission Date: 9/29/17   Discharge Date: 10/1/17  Hospital Discharge Diagnosis: Acute CVA   TCM Alice 90s-110s. Pre-dinner doesn't know. Was told to call Dr. Junior Silveira to decrease prednisone dose, hasn't seen him yet. Feels hungry with 79s, but not sweaty or irritable. C/o still left leg weakness. TCM nurse suggested home PT.  Pt has done some ex Suspension 2 sprays by Nasal route daily.  (Patient taking differently: 2 sprays by Nasal route daily as needed.  )   Metoprolol Succinate ER 50 MG Oral Tablet 24 Hr TAKE 1 TABLET(50 MG) BY MOUTH EVERY DAY   ELIQUIS 5 MG Oral Tab Take 1 tablet (5 mg total) instability. DTRs normal 2+ L/Es, left biceps may be stronger 3+ than right 2+. Muscle strength is equal or maybe even slightly decreased on right side.  Finger to nose testing normal. Rapid movements normal.   ASSESSMENT/ PLAN:   There are no diagnoses kristopher

## 2017-10-03 NOTE — PAYOR COMM NOTE
--------------  DISCHARGE REVIEW    Payor: [id number] [place] [address] #:  [id number]  Authorization Number: N/A    Admit date: 9/30/17  Admit time:  6351  Discharge Date: 10/1/2017  4:09 PM     Admitting Physician: Corky Caballero MD  Attending Phys hypertension  5. Sarcoidosis on steroids  6. Diabetes mellitus, type 2, uncontrolled, A1c 11.1   7. Noncompliance with diet  8.  Dehydration, resolved    History of Present Illness: Alexandre Kelley is a 79year old female with a history of Cerebrovascular (40 mg total) by mouth nightly.    Quantity:  30 tablet  Refills:  11        CHANGE how you take these medications      Instructions Prescription details   Insulin Glargine 300 UNIT/ML Sopn  Commonly known as:  TOUJEO SOLOSTAR  What changed:  how much to ta known as: Toprol XL      TAKE 1 TABLET(50 MG) BY MOUTH EVERY DAY   Quantity:  90 tablet  Refills:  1     ONETOUCH DELICA LANCETS FINE Misc      1 each by Other route 3 (three) times daily.    Quantity:  300 each  Refills:  1     predniSONE 20 MG Tabs  Comm

## 2017-10-06 ENCOUNTER — TELEPHONE (OUTPATIENT)
Dept: ENDOCRINOLOGY CLINIC | Facility: CLINIC | Age: 67
End: 2017-10-06

## 2017-10-06 DIAGNOSIS — Z79.4 TYPE 2 DIABETES MELLITUS WITH COMPLICATION, WITH LONG-TERM CURRENT USE OF INSULIN (HCC): Primary | ICD-10-CM

## 2017-10-06 DIAGNOSIS — E11.8 TYPE 2 DIABETES MELLITUS WITH COMPLICATION, WITH LONG-TERM CURRENT USE OF INSULIN (HCC): Primary | ICD-10-CM

## 2017-10-10 ENCOUNTER — OFFICE VISIT (OUTPATIENT)
Dept: ENDOCRINOLOGY CLINIC | Facility: CLINIC | Age: 67
End: 2017-10-10

## 2017-10-10 VITALS
SYSTOLIC BLOOD PRESSURE: 120 MMHG | TEMPERATURE: 98 F | HEART RATE: 96 BPM | HEIGHT: 62 IN | RESPIRATION RATE: 18 BRPM | BODY MASS INDEX: 29.44 KG/M2 | DIASTOLIC BLOOD PRESSURE: 68 MMHG | WEIGHT: 160 LBS

## 2017-10-10 DIAGNOSIS — E11.59 UNCONTROLLED TYPE 2 DIABETES MELLITUS WITH OTHER CIRCULATORY COMPLICATION, WITH LONG-TERM CURRENT USE OF INSULIN: Primary | ICD-10-CM

## 2017-10-10 DIAGNOSIS — Z79.4 UNCONTROLLED TYPE 2 DIABETES MELLITUS WITH OTHER CIRCULATORY COMPLICATION, WITH LONG-TERM CURRENT USE OF INSULIN: Primary | ICD-10-CM

## 2017-10-10 DIAGNOSIS — E11.65 UNCONTROLLED TYPE 2 DIABETES MELLITUS WITH OTHER CIRCULATORY COMPLICATION, WITH LONG-TERM CURRENT USE OF INSULIN: Primary | ICD-10-CM

## 2017-10-10 PROCEDURE — 99213 OFFICE O/P EST LOW 20 MIN: CPT | Performed by: NURSE PRACTITIONER

## 2017-10-10 RX ORDER — SULFAMETHOXAZOLE AND TRIMETHOPRIM 400; 80 MG/1; MG/1
TABLET ORAL
Refills: 3 | COMMUNITY
Start: 2017-09-14 | End: 2018-02-06

## 2017-10-10 NOTE — PROGRESS NOTES
CC: Patient presents with:  Diabetes: follow up. pt did bring readings.       HISTORY:  Past Medical History:   Diagnosis Date   • Arrhythmia     Afib: on Eloquis   • Atrial fibrillation ([facility] [address] 2003   • High blood pressure    • Stasis dermatitis    • Stroke exam needs  Respiratory: Negative for cough, chest tightness, shortness of breath and wheezing. Cardiovascular: Negative for chest pain, palpitations and leg swelling. ASA none daily on eliquis.    Gastrointestinal: Negative for nausea, vomiting, abdomina (three) times daily. , Disp: 300 each, Rfl: 1  •  Blood Gluc Meter Disp-Strips Does not apply Device, Test 3 times daily, Disp: 300 Device, Rfl: 3  •  Losartan Potassium 25 MG Oral Tab, TAKE 1 TABLET(25 MG) BY MOUTH EVERY DAY, Disp: 90 tablet, Rfl: 1  •  Am sent in readings as previously requested, Return in 3 weeks. Type 2 diabetes mellitus with hyperglycemia, without long-term current use of insulin (hcc) per above        No orders of the defined types were placed in this encounter.       Meds & Refills fo

## 2017-10-10 NOTE — PATIENT INSTRUCTIONS
Increase Toujeo to 30 units   Humalog 7 units before breakfast and 10 units before lunch and dinner   Plus sliding scale:    If sugar is over 180 mg/dl add 2 units over 210 mg/dl add 3 units over 240 mg/dl add 4 units over 280 mg/dl add 5 units  over 320 ad

## 2017-10-18 ENCOUNTER — TELEPHONE (OUTPATIENT)
Dept: INTERNAL MEDICINE CLINIC | Facility: CLINIC | Age: 67
End: 2017-10-18

## 2017-10-18 DIAGNOSIS — E11.65 TYPE 2 DIABETES MELLITUS WITH HYPERGLYCEMIA, WITHOUT LONG-TERM CURRENT USE OF INSULIN (HCC): ICD-10-CM

## 2017-10-18 DIAGNOSIS — Z79.4 TYPE 2 DIABETES MELLITUS WITH COMPLICATION, WITH LONG-TERM CURRENT USE OF INSULIN (HCC): ICD-10-CM

## 2017-10-18 DIAGNOSIS — E11.8 TYPE 2 DIABETES MELLITUS WITH COMPLICATION, WITH LONG-TERM CURRENT USE OF INSULIN (HCC): ICD-10-CM

## 2017-10-18 DIAGNOSIS — E11.65 UNCONTROLLED TYPE 2 DIABETES MELLITUS WITH HYPERGLYCEMIA, UNSPECIFIED LONG TERM INSULIN USE STATUS: ICD-10-CM

## 2017-10-18 NOTE — TELEPHONE ENCOUNTER
Sugar has been low for a week or so. Counts in morning 64 wakes up with heavy head. . Takes candy, drinks OJ and still not feeling good. Afternoon count 61 and the same thing happens again.   Night time is ok   Please call to advise

## 2017-10-19 NOTE — TELEPHONE ENCOUNTER
Please lower toujeo to 16 and humalog to 7 base breakfast and lunch see if this helps   Thanks hopefully she has f/u soon   Lizette FARRAR,CDE

## 2017-10-23 ENCOUNTER — MED REC SCAN ONLY (OUTPATIENT)
Dept: FAMILY MEDICINE CLINIC | Facility: CLINIC | Age: 67
End: 2017-10-23

## 2017-10-25 ENCOUNTER — TELEPHONE (OUTPATIENT)
Dept: INTERNAL MEDICINE CLINIC | Facility: CLINIC | Age: 67
End: 2017-10-25

## 2017-10-25 NOTE — TELEPHONE ENCOUNTER
When pt injects insulin she is noticing a little blood after the injection. Would like to know if this is normal and is the medication going in or does she do it again?  Please advise

## 2017-10-26 NOTE — TELEPHONE ENCOUNTER
Called pt told her what SC said and pt verbalize understanding and she is injecting in the leg not the stomach.

## 2017-10-26 NOTE — TELEPHONE ENCOUNTER
Maybe from her other meds and thinner blood, have on a short needle. Make sure she is gently inserting needle and putting in more cushioned areas of stomach. Can place finger over site after removes needle but dont' rub can cause a bruise.  This does someti

## 2017-11-29 ENCOUNTER — MED REC SCAN ONLY (OUTPATIENT)
Dept: FAMILY MEDICINE CLINIC | Facility: CLINIC | Age: 67
End: 2017-11-29

## 2017-12-10 ENCOUNTER — APPOINTMENT (OUTPATIENT)
Dept: GENERAL RADIOLOGY | Facility: HOSPITAL | Age: 67
End: 2017-12-10
Attending: EMERGENCY MEDICINE
Payer: MEDICARE

## 2017-12-10 ENCOUNTER — HOSPITAL ENCOUNTER (EMERGENCY)
Facility: HOSPITAL | Age: 67
Discharge: HOME | End: 2017-12-10
Attending: EMERGENCY MEDICINE
Payer: MEDICARE

## 2017-12-10 VITALS
BODY MASS INDEX: 29.44 KG/M2 | OXYGEN SATURATION: 98 % | DIASTOLIC BLOOD PRESSURE: 96 MMHG | HEART RATE: 98 BPM | RESPIRATION RATE: 22 BRPM | TEMPERATURE: 98 F | WEIGHT: 160 LBS | HEIGHT: 62 IN | SYSTOLIC BLOOD PRESSURE: 148 MMHG

## 2017-12-10 DIAGNOSIS — J40 BRONCHITIS: Primary | ICD-10-CM

## 2017-12-10 DIAGNOSIS — H10.33 ACUTE CONJUNCTIVITIS OF BOTH EYES, UNSPECIFIED ACUTE CONJUNCTIVITIS TYPE: ICD-10-CM

## 2017-12-10 PROCEDURE — 36415 COLL VENOUS BLD VENIPUNCTURE: CPT

## 2017-12-10 PROCEDURE — 84484 ASSAY OF TROPONIN QUANT: CPT | Performed by: EMERGENCY MEDICINE

## 2017-12-10 PROCEDURE — 99285 EMERGENCY DEPT VISIT HI MDM: CPT

## 2017-12-10 PROCEDURE — 93010 ELECTROCARDIOGRAM REPORT: CPT

## 2017-12-10 PROCEDURE — 80053 COMPREHEN METABOLIC PANEL: CPT | Performed by: EMERGENCY MEDICINE

## 2017-12-10 PROCEDURE — 71020 XR CHEST PA + LAT CHEST (CPT=71020): CPT | Performed by: EMERGENCY MEDICINE

## 2017-12-10 PROCEDURE — 85025 COMPLETE CBC W/AUTO DIFF WBC: CPT | Performed by: EMERGENCY MEDICINE

## 2017-12-10 PROCEDURE — 93005 ELECTROCARDIOGRAM TRACING: CPT

## 2017-12-10 RX ORDER — AZITHROMYCIN 250 MG/1
TABLET, FILM COATED ORAL
Qty: 1 PACKAGE | Refills: 0 | Status: SHIPPED | OUTPATIENT
Start: 2017-12-10 | End: 2017-12-15

## 2017-12-10 RX ORDER — POLYMYXIN B SULFATE AND TRIMETHOPRIM 1; 10000 MG/ML; [USP'U]/ML
1 SOLUTION OPHTHALMIC
Qty: 10 ML | Refills: 0 | Status: SHIPPED | OUTPATIENT
Start: 2017-12-10 | End: 2017-12-15

## 2017-12-10 NOTE — ED PROVIDER NOTES
Patient Seen in: [facility] Emergency Department    History   Patient presents with:  Dyspnea ANA MARIA SOB (respiratory)  Cough/URI    Stated Complaint: sob; cough    HPI    49-year-old with a history of atrial fibrillation on Eliquis, hypertension, diab 114  Resp: 22  Temp: 97.8 °F (36.6 °C)  Temp src: n/a  SpO2: 97 %  O2 Device: None (Room air)    Current:/89   Pulse 114   Temp 97.8 °F (36.6 °C)   Resp 22   Ht 157.5 cm (5' 2\")   Wt 72.6 kg   SpO2 97%   BMI 29.26 kg/m²         Physical Exam    Gene individual orders. EKG    Rate, intervals and axes as noted on EKG Report.   Rate: 107  Rhythm: Atrial Fibrillation  Reading: Abnormal due to rhythm         Chest x-ray: Cardiomegaly and normal pulmonary vascularity prominent interstitial markings likel

## 2017-12-21 ENCOUNTER — HOSPITAL ENCOUNTER (OUTPATIENT)
Dept: CT IMAGING | Facility: HOSPITAL | Age: 67
Discharge: HOME | End: 2017-12-21
Attending: INTERNAL MEDICINE
Payer: MEDICARE

## 2017-12-21 DIAGNOSIS — D86.9 SARCOID: ICD-10-CM

## 2017-12-21 PROCEDURE — 71250 CT THORAX DX C-: CPT | Performed by: INTERNAL MEDICINE

## 2018-01-02 DIAGNOSIS — E11.69 HYPERLIPIDEMIA ASSOCIATED WITH TYPE 2 DIABETES MELLITUS (HCC): Primary | ICD-10-CM

## 2018-01-02 DIAGNOSIS — E78.5 HYPERLIPIDEMIA ASSOCIATED WITH TYPE 2 DIABETES MELLITUS (HCC): Primary | ICD-10-CM

## 2018-01-02 DIAGNOSIS — E11.65 TYPE 2 DIABETES MELLITUS WITH HYPERGLYCEMIA, WITHOUT LONG-TERM CURRENT USE OF INSULIN (HCC): ICD-10-CM

## 2018-01-03 PROBLEM — E11.69 HYPERLIPIDEMIA ASSOCIATED WITH TYPE 2 DIABETES MELLITUS (HCC): Status: ACTIVE | Noted: 2018-01-03

## 2018-01-03 PROBLEM — E78.5 HYPERLIPIDEMIA ASSOCIATED WITH TYPE 2 DIABETES MELLITUS (HCC): Status: ACTIVE | Noted: 2018-01-03

## 2018-01-03 RX ORDER — METFORMIN HYDROCHLORIDE 500 MG/1
TABLET, EXTENDED RELEASE ORAL
Qty: 90 TABLET | Refills: 0 | Status: SHIPPED | OUTPATIENT
Start: 2018-01-03 | End: 2018-02-27

## 2018-01-03 NOTE — TELEPHONE ENCOUNTER
No future appointments. LOV 10/17    LAST LAB    LAST RX    PROTOCOL    Diabetic Medication Protocol Failed1/2 3:53 PM   Last HgBA1C < 7.5     Sees [facility]/ [facility] for Diabetes.

## 2018-01-09 ENCOUNTER — TELEPHONE (OUTPATIENT)
Dept: FAMILY MEDICINE CLINIC | Facility: CLINIC | Age: 68
End: 2018-01-09

## 2018-01-09 ENCOUNTER — TELEPHONE (OUTPATIENT)
Dept: INTERNAL MEDICINE CLINIC | Facility: CLINIC | Age: 68
End: 2018-01-09

## 2018-01-09 DIAGNOSIS — E11.65 UNCONTROLLED TYPE 2 DIABETES MELLITUS WITH HYPERGLYCEMIA, UNSPECIFIED LONG TERM INSULIN USE STATUS: ICD-10-CM

## 2018-01-09 DIAGNOSIS — Z79.4 TYPE 2 DIABETES MELLITUS WITH COMPLICATION, WITH LONG-TERM CURRENT USE OF INSULIN (HCC): ICD-10-CM

## 2018-01-09 DIAGNOSIS — E11.65 TYPE 2 DIABETES MELLITUS WITH HYPERGLYCEMIA, WITHOUT LONG-TERM CURRENT USE OF INSULIN (HCC): ICD-10-CM

## 2018-01-09 DIAGNOSIS — E11.8 TYPE 2 DIABETES MELLITUS WITH COMPLICATION, WITH LONG-TERM CURRENT USE OF INSULIN (HCC): ICD-10-CM

## 2018-01-09 NOTE — TELEPHONE ENCOUNTER
Left detailed message for patient on phone. Spoke to patient she will call DM ctr.      SIMEON Mars.  [facility]  [facility] Carlos A Isai Umanzor [address], [address] [facility]  Phone: [phone]

## 2018-01-09 NOTE — TELEPHONE ENCOUNTER
Pt needs to discuss changing her insulin-is not taking prednisone any longer and now needs to adjust her insulin-please call to advise

## 2018-01-09 NOTE — TELEPHONE ENCOUNTER
Pt did not tell me she is out of town until next week-cannot come in she would like a call today please

## 2018-01-09 NOTE — TELEPHONE ENCOUNTER
Patient called and requested to speak with the nurse in regards to her diabetes doctor. She said she needs the name and number of the doctor she sees for diabetes. I offered to help, and she said she wanted to speak with the nurse.   Transferred to triage

## 2018-01-26 ENCOUNTER — TELEPHONE (OUTPATIENT)
Dept: FAMILY MEDICINE CLINIC | Facility: CLINIC | Age: 68
End: 2018-01-26

## 2018-02-02 DIAGNOSIS — Z79.01 CHRONIC ANTICOAGULATION: ICD-10-CM

## 2018-02-02 DIAGNOSIS — I48.21 PERMANENT ATRIAL FIBRILLATION (HCC): ICD-10-CM

## 2018-02-05 RX ORDER — APIXABAN 5 MG/1
TABLET, FILM COATED ORAL
Qty: 180 TABLET | Refills: 0 | Status: SHIPPED | OUTPATIENT
Start: 2018-02-05

## 2018-02-05 NOTE — TELEPHONE ENCOUNTER
Future Appointments  Date Time Provider Binu Molly   2/5/2018 3:30 PM Yifan Galvan MD [facility] [address]   2/7/2018 10:15 AM Joni Reeder NP [facility]

## 2018-02-06 ENCOUNTER — OFFICE VISIT (OUTPATIENT)
Dept: FAMILY MEDICINE CLINIC | Facility: CLINIC | Age: 68
End: 2018-02-06

## 2018-02-06 VITALS
TEMPERATURE: 98 F | BODY MASS INDEX: 28.59 KG/M2 | SYSTOLIC BLOOD PRESSURE: 126 MMHG | RESPIRATION RATE: 16 BRPM | HEART RATE: 74 BPM | HEIGHT: 62 IN | WEIGHT: 155.38 LBS | DIASTOLIC BLOOD PRESSURE: 80 MMHG

## 2018-02-06 DIAGNOSIS — D86.9 SARCOIDOSIS: ICD-10-CM

## 2018-02-06 DIAGNOSIS — R59.0 MEDIASTINAL LYMPHADENOPATHY: ICD-10-CM

## 2018-02-06 DIAGNOSIS — IMO0001 UNCONTROLLED TYPE 2 DIABETES MELLITUS WITHOUT COMPLICATION, WITHOUT LONG-TERM CURRENT USE OF INSULIN: ICD-10-CM

## 2018-02-06 DIAGNOSIS — I10 ESSENTIAL HYPERTENSION, BENIGN: ICD-10-CM

## 2018-02-06 DIAGNOSIS — M25.561 ACUTE PAIN OF BOTH KNEES: Primary | ICD-10-CM

## 2018-02-06 DIAGNOSIS — M25.562 ACUTE PAIN OF BOTH KNEES: Primary | ICD-10-CM

## 2018-02-06 DIAGNOSIS — I48.21 PERMANENT ATRIAL FIBRILLATION (HCC): ICD-10-CM

## 2018-02-06 PROCEDURE — 99214 OFFICE O/P EST MOD 30 MIN: CPT | Performed by: FAMILY MEDICINE

## 2018-02-06 RX ORDER — AMLODIPINE BESYLATE 5 MG/1
TABLET ORAL
Qty: 90 TABLET | Refills: 1 | Status: ON HOLD | OUTPATIENT
Start: 2018-02-06 | End: 2018-03-31

## 2018-02-06 RX ORDER — LOSARTAN POTASSIUM 25 MG/1
TABLET ORAL
Qty: 90 TABLET | Refills: 1 | Status: SHIPPED | OUTPATIENT
Start: 2018-02-06

## 2018-02-06 RX ORDER — METOPROLOL SUCCINATE 50 MG/1
TABLET, EXTENDED RELEASE ORAL
Qty: 90 TABLET | Refills: 1 | Status: SHIPPED | OUTPATIENT
Start: 2018-02-06

## 2018-02-06 NOTE — PROGRESS NOTES
Kimmy Rodriguez IS A 76year old female HERE FOR Patient presents with:  Medication Follow-Up: Room 4. Med refills        History of present illness:     Knees hurting, referral requested for ortho. Muscles in anterior thigh hurting. Ankles also hurting. Prescriptions:   AmLODIPine Besylate 5 MG Oral Tab TAKE 1 TABLET(5 MG) BY MOUTH EVERY DAY Disp: 90 tablet Rfl: 1   Losartan Potassium 25 MG Oral Tab TAKE 1 TABLET(25 MG) BY MOUTH EVERY DAY Disp: 90 tablet Rfl: 1   Metoprolol Succinate ER 50 MG Oral Tablet 2 • Stroke Sister        Social history:         Social History  Social History   Marital status:    Spouse name: N/A    Years of education: N/A  Number of children: N/A     Occupational History  None on file     Social History Main Topics   Smoking 4.50    • HGB 12/10/2017 12.6    • HCT 12/10/2017 39.4    • PLT 12/10/2017 281.0    • MCV 12/10/2017 87.6    • MCH 12/10/2017 28.0    • MCHC 12/10/2017 32.0    • RDW 12/10/2017 13.7    • RDW-SD 12/10/2017 43.9    • Neutrophil Absolute Prel* 12/10/2017 10.2 • Blood Urine 09/29/2017 Trace-Intact*   • pH Urine 09/29/2017 7.0    • Protein Urine 09/29/2017 Trace*   • Urobilinogen Urine 09/29/2017 0.2    • Nitrite Urine 09/29/2017 Negative    • Leukocyte Esterase Urine 09/29/2017 Negative    • Microscopic 09/29/ 09/30/2017 103    • CO2 09/30/2017 28.0    • WBC 09/30/2017 11.8    • RBC 09/30/2017 4.69    • HGB 09/30/2017 13.0    • HCT 09/30/2017 39.6    • PLT 09/30/2017 262.0    • MCV 09/30/2017 84.4    • MCH 09/30/2017 27.7    • MCHC 09/30/2017 32.8    • RDW 09/30 Assessment & Plan:   Leonid Toribio was seen today for medication follow-up.     Diagnoses and all orders for this visit:    Acute pain of both knees--suspect mild arthritis, alternatively could consider taking off atorvastatin for a short time to see if

## 2018-02-06 NOTE — PATIENT INSTRUCTIONS
Support hose for legs can help with varicose veins and swelling. X-rays for arthritis. For moderate arthritis, will refer to orthopedics to consider some type of injection. For mild, will plan physical therapy.  Tylenol & aspercreme    Follow up with [facility]

## 2018-02-06 NOTE — PROGRESS NOTES
Knee exam: some crepitus right knee, little left. No effusion. Full ROM bilateral. Stability not tested. Gait stable. Has some medial tenderness bilateral and right lateral tenderness at joint line.

## 2018-02-07 ENCOUNTER — PRIOR ORIGINAL RECORDS (OUTPATIENT)
Dept: OTHER | Age: 68
End: 2018-02-07

## 2018-02-07 ENCOUNTER — OFFICE VISIT (OUTPATIENT)
Dept: ENDOCRINOLOGY CLINIC | Facility: CLINIC | Age: 68
End: 2018-02-07

## 2018-02-07 VITALS
SYSTOLIC BLOOD PRESSURE: 138 MMHG | WEIGHT: 155 LBS | HEART RATE: 80 BPM | DIASTOLIC BLOOD PRESSURE: 80 MMHG | HEIGHT: 62 IN | TEMPERATURE: 98 F | RESPIRATION RATE: 18 BRPM | BODY MASS INDEX: 28.52 KG/M2

## 2018-02-07 DIAGNOSIS — E11.59 UNCONTROLLED TYPE 2 DIABETES MELLITUS WITH OTHER CIRCULATORY COMPLICATION, WITH LONG-TERM CURRENT USE OF INSULIN: Primary | ICD-10-CM

## 2018-02-07 DIAGNOSIS — E11.65 UNCONTROLLED TYPE 2 DIABETES MELLITUS WITH OTHER CIRCULATORY COMPLICATION, WITH LONG-TERM CURRENT USE OF INSULIN: Primary | ICD-10-CM

## 2018-02-07 DIAGNOSIS — Z79.4 UNCONTROLLED TYPE 2 DIABETES MELLITUS WITH OTHER CIRCULATORY COMPLICATION, WITH LONG-TERM CURRENT USE OF INSULIN: Primary | ICD-10-CM

## 2018-02-07 LAB
CARTRIDGE LOT#: 803 NUMERIC
HEMOGLOBIN A1C: 7.8 % (ref 4.3–5.6)

## 2018-02-07 PROCEDURE — 99214 OFFICE O/P EST MOD 30 MIN: CPT | Performed by: NURSE PRACTITIONER

## 2018-02-07 PROCEDURE — 83036 HEMOGLOBIN GLYCOSYLATED A1C: CPT | Performed by: NURSE PRACTITIONER

## 2018-02-07 NOTE — PROGRESS NOTES
CC: Patient presents with:  Diabetes: follow up. pt forgot meter.       HISTORY:  Past Medical History:   Diagnosis Date   • Arrhythmia     Afib: on Eloquis   • Atrial fibrillation ([facility] [address] 2003   • High blood pressure    • Stasis dermatitis    • Stroke ([facility] [address] started using calves for injections feels abdomen too painful.      Hypertension:  Blood Pressure: at goal    Medication: metoprolol, amlodipine, losartan  SE none    Hyperlipidemia:  LDL:  79   Medication:  None due recheck   ROS:   Constitutional: Negativ mg total) by mouth daily. , Disp: 30 tablet, Rfl: 11  •  atorvastatin 40 MG Oral Tab, Take 1 tablet (40 mg total) by mouth nightly., Disp: 30 tablet, Rfl: 11  •  lansoprazole 30 MG Oral Capsule Delayed Release, Take 30 mg by mouth every morning before break pulse exam of both lower legs/feet is normal as well    Assessment and Plan:  Uncontrolled type 2 diabetes mellitus with hyperglycemia, unspecified long term insulin use status (hcc)  (primary encounter diagnosis): plan humalog 6 units ac meals, resume dante

## 2018-02-07 NOTE — PATIENT INSTRUCTIONS
Take 10 units Toujeo every night    Take 6 or 7 units Humalog before breakfast and dinner    DO NOT inject insulin anywhere below your knee. Start to inject your insulin on the side of your leg (thigh) or hip area.       Follow up in 3 weeks

## 2018-02-07 NOTE — PROGRESS NOTES
Diabetes Education:    Bart Bautista has been injecting her insulin in her calf. She states that it hurts to do injections in her stomach. Explained that this was never a recommended place for injections. Provided her with a handout on site selection.   Emi

## 2018-02-15 ENCOUNTER — HOSPITAL ENCOUNTER (OUTPATIENT)
Dept: GENERAL RADIOLOGY | Age: 68
Discharge: HOME | End: 2018-02-15
Attending: FAMILY MEDICINE
Payer: MEDICARE

## 2018-02-15 ENCOUNTER — PRIOR ORIGINAL RECORDS (OUTPATIENT)
Dept: OTHER | Age: 68
End: 2018-02-15

## 2018-02-15 ENCOUNTER — APPOINTMENT (OUTPATIENT)
Dept: LAB | Age: 68
End: 2018-02-15
Attending: NURSE PRACTITIONER
Payer: MEDICARE

## 2018-02-15 DIAGNOSIS — M25.562 ACUTE PAIN OF BOTH KNEES: ICD-10-CM

## 2018-02-15 DIAGNOSIS — E11.65 TYPE 2 DIABETES MELLITUS WITH HYPERGLYCEMIA, WITHOUT LONG-TERM CURRENT USE OF INSULIN (HCC): ICD-10-CM

## 2018-02-15 DIAGNOSIS — M25.561 ACUTE PAIN OF BOTH KNEES: ICD-10-CM

## 2018-02-15 DIAGNOSIS — E78.5 HYPERLIPIDEMIA ASSOCIATED WITH TYPE 2 DIABETES MELLITUS (HCC): ICD-10-CM

## 2018-02-15 DIAGNOSIS — E11.69 HYPERLIPIDEMIA ASSOCIATED WITH TYPE 2 DIABETES MELLITUS (HCC): ICD-10-CM

## 2018-02-15 LAB
ALBUMIN SERPL-MCNC: 3.1 G/DL (ref 3.5–4.8)
ALP LIVER SERPL-CCNC: 89 U/L (ref 55–142)
ALT SERPL-CCNC: 29 U/L (ref 14–54)
AST SERPL-CCNC: 32 U/L (ref 15–41)
BILIRUB SERPL-MCNC: 0.5 MG/DL (ref 0.1–2)
BUN BLD-MCNC: 13 MG/DL (ref 8–20)
CALCIUM BLD-MCNC: 9.4 MG/DL (ref 8.3–10.3)
CHLORIDE: 104 MMOL/L (ref 101–111)
CHOLEST SMN-MCNC: 171 MG/DL (ref ?–200)
CO2: 30 MMOL/L (ref 22–32)
CREAT BLD-MCNC: 0.8 MG/DL (ref 0.55–1.02)
CREAT UR-SCNC: 63 MG/DL
EST. AVERAGE GLUCOSE BLD GHB EST-MCNC: 197 MG/DL (ref 68–126)
GLUCOSE BLD-MCNC: 125 MG/DL (ref 70–99)
HBA1C MFR BLD HPLC: 8.5 % (ref ?–5.7)
HDLC SERPL-MCNC: 51 MG/DL (ref 45–?)
HDLC SERPL: 3.35 {RATIO} (ref ?–4.44)
LDLC SERPL CALC-MCNC: 90 MG/DL (ref ?–130)
M PROTEIN MFR SERPL ELPH: 7.5 G/DL (ref 6.1–8.3)
MICROALBUMIN UR-MCNC: 13.4 MG/DL
MICROALBUMIN/CREAT 24H UR-RTO: 212.7 UG/MG (ref ?–30)
NONHDLC SERPL-MCNC: 120 MG/DL (ref ?–130)
POTASSIUM SERPL-SCNC: 3.7 MMOL/L (ref 3.6–5.1)
SODIUM SERPL-SCNC: 141 MMOL/L (ref 136–144)
TRIGL SERPL-MCNC: 152 MG/DL (ref ?–150)
VLDLC SERPL CALC-MCNC: 30 MG/DL (ref 5–40)

## 2018-02-15 PROCEDURE — 82570 ASSAY OF URINE CREATININE: CPT | Performed by: NURSE PRACTITIONER

## 2018-02-15 PROCEDURE — 80061 LIPID PANEL: CPT | Performed by: NURSE PRACTITIONER

## 2018-02-15 PROCEDURE — 83036 HEMOGLOBIN GLYCOSYLATED A1C: CPT | Performed by: NURSE PRACTITIONER

## 2018-02-15 PROCEDURE — 82043 UR ALBUMIN QUANTITATIVE: CPT | Performed by: NURSE PRACTITIONER

## 2018-02-15 PROCEDURE — 80053 COMPREHEN METABOLIC PANEL: CPT | Performed by: NURSE PRACTITIONER

## 2018-02-15 PROCEDURE — 73562 X-RAY EXAM OF KNEE 3: CPT | Performed by: FAMILY MEDICINE

## 2018-02-15 PROCEDURE — 36415 COLL VENOUS BLD VENIPUNCTURE: CPT | Performed by: NURSE PRACTITIONER

## 2018-02-19 ENCOUNTER — PRIOR ORIGINAL RECORDS (OUTPATIENT)
Dept: OTHER | Age: 68
End: 2018-02-19

## 2018-02-27 ENCOUNTER — MYAURORA ACCOUNT LINK (OUTPATIENT)
Dept: OTHER | Age: 68
End: 2018-02-27

## 2018-02-27 ENCOUNTER — PRIOR ORIGINAL RECORDS (OUTPATIENT)
Dept: OTHER | Age: 68
End: 2018-02-27

## 2018-02-27 DIAGNOSIS — E11.65 TYPE 2 DIABETES MELLITUS WITH HYPERGLYCEMIA, WITHOUT LONG-TERM CURRENT USE OF INSULIN (HCC): ICD-10-CM

## 2018-02-27 PROBLEM — M17.0 PRIMARY OSTEOARTHRITIS OF BOTH KNEES: Status: ACTIVE | Noted: 2018-02-27

## 2018-02-27 RX ORDER — METFORMIN HYDROCHLORIDE 500 MG/1
TABLET, EXTENDED RELEASE ORAL
Qty: 90 TABLET | Refills: 0 | Status: SHIPPED | OUTPATIENT
Start: 2018-02-27 | End: 2018-03-16

## 2018-02-27 NOTE — TELEPHONE ENCOUNTER
Future Appointments  Date Time Provider Binu Barnes   3/9/2018 1:00 PM Nuha Aguilar NP [facility]     Labs done for Leandra Barretter in Feb

## 2018-03-01 LAB
ALBUMIN: 3.1 G/DL
ALKALINE PHOSPHATATE(ALK PHOS): 89 IU/L
BILIRUBIN TOTAL: 0.5 MG/DL
BUN: 13 MG/DL
CALCIUM: 9.4 MG/DL
CHLORIDE: 104 MEQ/L
CHOLESTEROL, TOTAL: 171 MG/DL
CREATININE, SERUM: 0.8 MG/DL
GLUCOSE: 125 MG/DL
HDL CHOLESTEROL: 51 MG/DL
HEMOGLOBIN A1C: 7.8 %
HEMOGLOBIN A1C: 8.5 %
LDL CHOLESTEROL: 90 MG/DL
POTASSIUM, SERUM: 3.7 MEQ/L
PROTEIN, TOTAL: 7.5 G/DL
SGOT (AST): 32 IU/L
SGPT (ALT): 29 IU/L
SODIUM: 141 MEQ/L
TRIGLYCERIDES: 152 MG/DL

## 2018-03-05 ENCOUNTER — TELEPHONE (OUTPATIENT)
Dept: SURGERY | Facility: CLINIC | Age: 68
End: 2018-03-05

## 2018-03-05 DIAGNOSIS — Z79.4 TYPE 2 DIABETES MELLITUS WITH COMPLICATION, WITH LONG-TERM CURRENT USE OF INSULIN (HCC): ICD-10-CM

## 2018-03-05 DIAGNOSIS — E11.8 TYPE 2 DIABETES MELLITUS WITH COMPLICATION, WITH LONG-TERM CURRENT USE OF INSULIN (HCC): ICD-10-CM

## 2018-03-05 NOTE — TELEPHONE ENCOUNTER
Pt originally seen as IP by Dr. Chica Lee in March 2017 after CVA, at which time ASA was added with Eliquis. Pt then followed up in office with Dr. Andreas Santiago in June 2017 for headaches.   She was subsequently admitted again in Sept 2017 for another acute CVA, w

## 2018-03-05 NOTE — TELEPHONE ENCOUNTER
Future Appointments  Date Time Provider Binu Molly   9/12/2017 1:15 PM Joy Botello NP [facility]     LOV    LAST LAB    LAST RX    AmLODIPine Besylate 5 MG Oral Tab 90 tablet 1 7/18/2017     Metoprolol Succinate ER 50 MG Oral Tabl
Patient called and insists on speaking with someone regarding refill. She held for 10 min. She states she is expecting our call today.
Spoke with pt after calling [facility]  The Rx was sent over in July.  Pt was unaware that she had a new Rx there    Pharmacy is filling now and pt can  later today
Calm

## 2018-03-05 NOTE — TELEPHONE ENCOUNTER
Medication(s) to Refill:   Pending Prescriptions Disp Refills    Insulin Glargine (TOUJEO SOLOSTAR) 300 UNIT/ML Subcutaneous Solution Pen-injector 3 mL 1     Sig: Inject 10 Units into the skin daily.              Reason for Medication Refill being sent to [facility]

## 2018-03-06 ENCOUNTER — TELEPHONE (OUTPATIENT)
Dept: INTERNAL MEDICINE CLINIC | Facility: CLINIC | Age: 68
End: 2018-03-06

## 2018-03-06 DIAGNOSIS — E11.65 UNCONTROLLED TYPE 2 DIABETES MELLITUS WITH HYPERGLYCEMIA, UNSPECIFIED LONG TERM INSULIN USE STATUS: ICD-10-CM

## 2018-03-06 DIAGNOSIS — E11.65 TYPE 2 DIABETES MELLITUS WITH HYPERGLYCEMIA, WITHOUT LONG-TERM CURRENT USE OF INSULIN (HCC): ICD-10-CM

## 2018-03-06 NOTE — TELEPHONE ENCOUNTER
Pt states she needs rx for humalog-per pharm we denied the rx? ? Please call pt back to let her know if you are going to send in new rx

## 2018-03-07 ENCOUNTER — OFFICE VISIT (OUTPATIENT)
Dept: PHYSICAL THERAPY | Age: 68
End: 2018-03-07
Attending: ORTHOPAEDIC SURGERY
Payer: MEDICARE

## 2018-03-07 DIAGNOSIS — M17.0 PRIMARY OSTEOARTHRITIS OF BOTH KNEES: ICD-10-CM

## 2018-03-07 PROCEDURE — 97110 THERAPEUTIC EXERCISES: CPT

## 2018-03-07 PROCEDURE — 97161 PT EVAL LOW COMPLEX 20 MIN: CPT

## 2018-03-08 NOTE — PROGRESS NOTES
LOWER EXTREMITY EVALUATION:   Referring Physician: Dr. 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 Treatment will include: Manual Therapy; Therapeutic Exercises;  Daune Newness

## 2018-03-09 ENCOUNTER — OFFICE VISIT (OUTPATIENT)
Dept: ENDOCRINOLOGY CLINIC | Facility: CLINIC | Age: 68
End: 2018-03-09

## 2018-03-09 VITALS
TEMPERATURE: 98 F | HEIGHT: 62 IN | DIASTOLIC BLOOD PRESSURE: 60 MMHG | HEART RATE: 68 BPM | WEIGHT: 152 LBS | BODY MASS INDEX: 27.97 KG/M2 | SYSTOLIC BLOOD PRESSURE: 120 MMHG | RESPIRATION RATE: 18 BRPM

## 2018-03-09 DIAGNOSIS — E11.65 UNCONTROLLED TYPE 2 DIABETES MELLITUS WITH OTHER CIRCULATORY COMPLICATION, WITH LONG-TERM CURRENT USE OF INSULIN: Primary | ICD-10-CM

## 2018-03-09 DIAGNOSIS — E11.59 UNCONTROLLED TYPE 2 DIABETES MELLITUS WITH OTHER CIRCULATORY COMPLICATION, WITH LONG-TERM CURRENT USE OF INSULIN: Primary | ICD-10-CM

## 2018-03-09 DIAGNOSIS — Z79.4 UNCONTROLLED TYPE 2 DIABETES MELLITUS WITH OTHER CIRCULATORY COMPLICATION, WITH LONG-TERM CURRENT USE OF INSULIN: Primary | ICD-10-CM

## 2018-03-09 PROCEDURE — 99213 OFFICE O/P EST LOW 20 MIN: CPT | Performed by: NURSE PRACTITIONER

## 2018-03-09 NOTE — PROGRESS NOTES
CC: Patient presents with:  Diabetes: follow up. pt forgot meter.       HISTORY:  Past Medical History:   Diagnosis Date   • Arrhythmia     Afib: on Eloquis   • Atrial fibrillation ([facility] [address] 2003   • High blood pressure    • Stasis dermatitis    • Stroke ([facility] [address] calves for injections feels abdomen too painful.      Hypertension:  Blood Pressure: at goal    Medication: metoprolol, amlodipine, losartan  SE none    Hyperlipidemia:  LDL:  90   Medication:  None   ROS:   Constitutional: Negative for fever, chills and fa MOUTH EVERY DAY, Disp: 90 tablet, Rfl: 1  •  ELIQUIS 5 MG Oral Tab, TAKE 1 TABLET BY MOUTH TWICE DAILY, Disp: 180 tablet, Rfl: 0  •  aspirin 81 MG Oral Chew Tab, Chew 1 tablet (81 mg total) by mouth daily. , Disp: 30 tablet, Rfl: 11  •  atorvastatin 40 MG O Plan:  Uncontrolled type 2 diabetes mellitus with hyperglycemia, unspecified long term insulin use status (hcc)  (primary encounter diagnosis): plan humalog 6 units ac B/L, continue toujeo 10 units daily,  test BG ac meals and hs, continue metformin xr to

## 2018-03-14 ENCOUNTER — OFFICE VISIT (OUTPATIENT)
Dept: PHYSICAL THERAPY | Age: 68
End: 2018-03-14
Attending: ORTHOPAEDIC SURGERY
Payer: MEDICARE

## 2018-03-14 PROCEDURE — 97140 MANUAL THERAPY 1/> REGIONS: CPT

## 2018-03-14 PROCEDURE — 97110 THERAPEUTIC EXERCISES: CPT

## 2018-03-14 NOTE — PROGRESS NOTES
Dx:  B knee pain OA       Authorized # of Visits:  10        Next MD visit: none scheduled  Fall Risk: standard         Precautions: n/a             Subjective:  Pt reports feeling very sore today in both her knees.  She is having a hard time walking    Obj 20 mins                                                      Skilled Services:  Manual PT and skilled exercises issued to pt    Charges:  EX 1 MT 2    Total Timed Treatment:  45  min  Total Treatment Time:  45 min

## 2018-03-16 DIAGNOSIS — E11.65 TYPE 2 DIABETES MELLITUS WITH HYPERGLYCEMIA, WITHOUT LONG-TERM CURRENT USE OF INSULIN (HCC): ICD-10-CM

## 2018-03-16 DIAGNOSIS — E11.65 UNCONTROLLED TYPE 2 DIABETES MELLITUS WITH HYPERGLYCEMIA, UNSPECIFIED LONG TERM INSULIN USE STATUS: ICD-10-CM

## 2018-03-16 RX ORDER — PEN NEEDLE, DIABETIC 32GX 5/32"
NEEDLE, DISPOSABLE MISCELLANEOUS
Qty: 200 EACH | Refills: 6 | Status: SHIPPED | OUTPATIENT
Start: 2018-03-16

## 2018-03-16 RX ORDER — METFORMIN HYDROCHLORIDE 500 MG/1
TABLET, EXTENDED RELEASE ORAL
Qty: 90 TABLET | Refills: 0 | Status: ON HOLD | OUTPATIENT
Start: 2018-03-16 | End: 2018-03-31

## 2018-03-18 ENCOUNTER — HOSPITAL ENCOUNTER (INPATIENT)
Facility: HOSPITAL | Age: 68
LOS: 12 days | Discharge: ANOTHER HOSPITAL | DRG: 166 | End: 2018-03-31
Attending: EMERGENCY MEDICINE | Admitting: HOSPITALIST
Payer: MEDICARE

## 2018-03-18 DIAGNOSIS — R09.02 HYPOXIA: ICD-10-CM

## 2018-03-18 DIAGNOSIS — J81.0 ACUTE PULMONARY EDEMA (HCC): Primary | ICD-10-CM

## 2018-03-18 PROCEDURE — 5A09557 ASSISTANCE WITH RESPIRATORY VENTILATION, GREATER THAN 96 CONSECUTIVE HOURS, CONTINUOUS POSITIVE AIRWAY PRESSURE: ICD-10-PCS | Performed by: NURSE PRACTITIONER

## 2018-03-19 ENCOUNTER — APPOINTMENT (OUTPATIENT)
Dept: CV DIAGNOSTICS | Facility: HOSPITAL | Age: 68
DRG: 166 | End: 2018-03-19
Attending: HOSPITALIST
Payer: MEDICARE

## 2018-03-19 ENCOUNTER — APPOINTMENT (OUTPATIENT)
Dept: GENERAL RADIOLOGY | Facility: HOSPITAL | Age: 68
DRG: 166 | End: 2018-03-19
Attending: EMERGENCY MEDICINE
Payer: MEDICARE

## 2018-03-19 PROBLEM — J81.0 ACUTE PULMONARY EDEMA (HCC): Status: ACTIVE | Noted: 2018-03-19

## 2018-03-19 PROBLEM — R09.02 HYPOXIA: Status: ACTIVE | Noted: 2018-03-19

## 2018-03-19 PROCEDURE — 71045 X-RAY EXAM CHEST 1 VIEW: CPT | Performed by: EMERGENCY MEDICINE

## 2018-03-19 PROCEDURE — 93306 TTE W/DOPPLER COMPLETE: CPT | Performed by: HOSPITALIST

## 2018-03-19 PROCEDURE — 99223 1ST HOSP IP/OBS HIGH 75: CPT | Performed by: HOSPITALIST

## 2018-03-19 RX ORDER — FUROSEMIDE 10 MG/ML
40 INJECTION INTRAMUSCULAR; INTRAVENOUS ONCE
Status: COMPLETED | OUTPATIENT
Start: 2018-03-19 | End: 2018-03-19

## 2018-03-19 RX ORDER — FUROSEMIDE 10 MG/ML
40 INJECTION INTRAMUSCULAR; INTRAVENOUS
Status: DISCONTINUED | OUTPATIENT
Start: 2018-03-19 | End: 2018-03-20

## 2018-03-19 RX ORDER — ONDANSETRON 2 MG/ML
4 INJECTION INTRAMUSCULAR; INTRAVENOUS EVERY 6 HOURS PRN
Status: DISCONTINUED | OUTPATIENT
Start: 2018-03-19 | End: 2018-03-31

## 2018-03-19 RX ORDER — DEXTROSE MONOHYDRATE 25 G/50ML
50 INJECTION, SOLUTION INTRAVENOUS
Status: DISCONTINUED | OUTPATIENT
Start: 2018-03-19 | End: 2018-03-31

## 2018-03-19 RX ORDER — ACETAMINOPHEN 325 MG/1
650 TABLET ORAL EVERY 6 HOURS PRN
Status: DISCONTINUED | OUTPATIENT
Start: 2018-03-19 | End: 2018-03-31

## 2018-03-19 RX ORDER — IPRATROPIUM BROMIDE AND ALBUTEROL SULFATE 2.5; .5 MG/3ML; MG/3ML
3 SOLUTION RESPIRATORY (INHALATION) EVERY 6 HOURS PRN
Status: DISCONTINUED | OUTPATIENT
Start: 2018-03-19 | End: 2018-03-31

## 2018-03-19 RX ORDER — DILTIAZEM HYDROCHLORIDE 5 MG/ML
5 INJECTION INTRAVENOUS ONCE
Status: COMPLETED | OUTPATIENT
Start: 2018-03-19 | End: 2018-03-19

## 2018-03-19 RX ORDER — TRAMADOL HYDROCHLORIDE 50 MG/1
50 TABLET ORAL EVERY 6 HOURS PRN
Status: DISCONTINUED | OUTPATIENT
Start: 2018-03-19 | End: 2018-03-31

## 2018-03-19 RX ORDER — POTASSIUM CHLORIDE 20 MEQ/1
40 TABLET, EXTENDED RELEASE ORAL EVERY 4 HOURS
Status: COMPLETED | OUTPATIENT
Start: 2018-03-19 | End: 2018-03-19

## 2018-03-19 RX ORDER — LOSARTAN POTASSIUM 50 MG/1
25 TABLET ORAL DAILY
Status: DISCONTINUED | OUTPATIENT
Start: 2018-03-19 | End: 2018-03-31

## 2018-03-19 RX ORDER — ASPIRIN 81 MG/1
81 TABLET, CHEWABLE ORAL DAILY
Status: DISCONTINUED | OUTPATIENT
Start: 2018-03-19 | End: 2018-03-31

## 2018-03-19 RX ORDER — PANTOPRAZOLE SODIUM 40 MG/1
40 TABLET, DELAYED RELEASE ORAL
Status: DISCONTINUED | OUTPATIENT
Start: 2018-03-19 | End: 2018-03-31

## 2018-03-19 RX ORDER — METOPROLOL SUCCINATE 50 MG/1
50 TABLET, EXTENDED RELEASE ORAL
Status: DISCONTINUED | OUTPATIENT
Start: 2018-03-19 | End: 2018-03-31

## 2018-03-19 NOTE — PHYSICAL THERAPY NOTE
PHYSICAL THERAPY EVALUATION - INPATIENT     Room Number: 8754/1156-H  Evaluation Date: 3/19/2018  Type of Evaluation: Initial  Physician Order: PT Eval and Treat    Presenting Problem: Acute Pulomary edema  Reason for Therapy: Mobility Dysfunction an not drive and reports her children pick her up to  her to appointments, grocery store, etc.     SUBJECTIVE  \"My breathing is a little hard and I am cold. \" pt willing to participate with PT this morning.  States she has SOB throughout the session tho another person does the patient currently need. ..   -   Moving to and from a bed to a chair (including a wheelchair)?: None   -   Need to walk in hospital room?: A Little   -   Climbing 3-5 steps with a railing?: A Lot       AM-PAC Score:  Raw Score: 20 the patient presents with the following impairments general weakness, SOB, decreased tolerance for exercise and ambulation.   Functional outcome measures completed include AM-PAC score of 28.97% degree of impairment and Modified BORD Dyspnea Scale: 4/10 at

## 2018-03-19 NOTE — CONSULTS
[facility]  Report of Consultation    Kathy Murray Patient Status:  Inpatient    1950 MRN [id number]   [facility] 8NE-A Attending Seamus Payne MD   Hosp Day # 0 PCP Lazarus Elam MD     Reason for Consultation:  [facility] Subcutaneous, Daily  •  Insulin Aspart Pen (NOVOLOG) 100 UNIT/ML flexpen 6 Units, 6 Units, Subcutaneous, BID  •  Metoprolol Succinate ER (Toprol XL) 24 hr tab 50 mg, 50 mg, Oral, Daily Beta Blocker  •  Pantoprazole Sodium (PROTONIX) EC tab 40 mg, 40 mg, Or carotids 2+ no bruits. Cardiac: Irregular tachycardia, S1, S2 normal, rub or gallop. 2/6 HSM  Lungs: Course throughout  Abdomen: Soft, non-tender. Extremities: Without clubbing, cyanosis or edema. Peripheral pulses are 2+.   Neurologic: Alert and orient Eliquis only   - Expect rate to improve with treatment of SOB    5.  DM   - Per hospitalist   - Cont Losartan    Michelle Schrader  3/19/2018  8:40 AM

## 2018-03-19 NOTE — PROGRESS NOTES
[facility]  Progress Note     Nikki Benz Patient Status:  Inpatient    1950 MRN [id number]   [facility] 8NE-A Attending Barbara Theodore MD   Hosp Day # 0 PCP Madelaine Bautista MD     Chief Complaint:  SOB     S: Patient  H detemir  10 Units Subcutaneous Daily   • Insulin Aspart Pen  6 Units Subcutaneous BID   • Metoprolol Succinate ER  50 mg Oral Daily Beta Blocker   • Pantoprazole Sodium  40 mg Oral QAM AC   • furosemide  40 mg Intravenous BID (Diuretic)   • Potassium Chlor

## 2018-03-19 NOTE — ED INITIAL ASSESSMENT (HPI)
Pt to er with family cc sob with exertion x one week and states chills for past 3 days.  Denies n/v/d.

## 2018-03-19 NOTE — HOME CARE LIAISON
Referral received in rounds today from  Janet Bob. [facility] is not contracted with patient's insurance: Paul ji.  Thank you for the referral.

## 2018-03-19 NOTE — H&P
[facility]  History and Physical     Catrinarandee Sapp Patient Status:  Emergency    1950 MRN [id number]   Location [facility] Attending Sam Jacques MD   Hosp Day # 0 PCP Elidia Garcia MD     Chief Complaint Encounter:  METFORMIN HCL  MG Oral Tablet 24 Hr TAKE 1 TABLET BY MOUTH WITH BREAKFAST AND 2 TABLETS BY MOUTH WITH DINNER Disp: 90 tablet Rfl: 0   BD PEN NEEDLE SELENA U/F 32G X 4 MM Does not apply Misc USE FOUR TIMES DAILY WITH INSULIN Disp: 200 each R tablet by mouth daily. Disp:  Rfl:        Review of Systems:   A comprehensive 14 point review of systems was completed. Pertinent positives and negatives noted in the HPI.     Physical Exam:    /66   Pulse 102   Temp (!) 97.5 °F (36.4 °C) (Oral) taken off Lipitor  7. Cerebrovascular disease  1.  PT/OT    Quality:  · DVT Prophylaxis: Eliquis    Plan of care discussed with patient, family and ER team.    Paulo Estes MD  3/19/2018

## 2018-03-19 NOTE — OCCUPATIONAL THERAPY NOTE
OCCUPATIONAL THERAPY QUICK EVALUATION - INPATIENT    Room Number: 5675/1435-N  Evaluation Date: 3/19/2018     Type of Evaluation: Quick Eval  Presenting Problem: Acute Pulmonary Edema    Physician Order: IP Consult to Occupational Therapy  Reason for TORI STAFFORD [facility] History of Present Illness: Kimmy Rodriguez is a 76year old female with a history of Atrial fibrillation on Eliquis, Essential hypertension, Dyslipidemia - taken off Lipitor, Cerebrovascular disease and Diabetes mellitus who presents with shortness of br Fall Risk: High fall risk    WEIGHT BEARING RESTRICTION  Weight Bearing Restriction: None    PAIN ASSESSMENT  Ratin    COGNITION  NO issues noted, able to make needs known    RANGE OF MOTION AND STRENGTH ASSESSMENT  Upper extremity ROM is within functi Skilled Therapy Provided: Pt presents at EOB. Pt education on Role of OT, POC, D/C plan, Energy Conservation Techniques, Home Safety/Fall Prevention  with patient asking questions related to home/work/community/lesiure situations.  Pt/therapist problem sol Patient able to toilet transfer: at previous functional level  Patient able to dress lower extremities: at previous functional level  Patient/Caregiver able to demonstrate safety with ADLS: at previous functional level

## 2018-03-19 NOTE — HISTORICAL OFFICE NOTE
Tremaine Nandini  : 1950  ACCOUNT:  [id number]  [phone]  PCP: Dr. Pena Brochure     TODAY'S DATE: 2018  DICTATED BY:  [Dr. Jose Moffett: [Followup of .  CAD, of native vessels, nonobstructive and Followup of Chronic atria non-obstructive CAD. will try to get medical records    FAMILY HISTORY: Negative for premature CAD. Negative for AAA. SOCIAL HISTORY: SMOKING: Never used tobacco. denies smoking. CAFFEINE: 2-3 cups tea per day. ALCOHOL: denies drinking.  EXERCISE: stationa will follow this very nice lady in office.]    ASSESSMENT:  1. . CAD, of native vessels, nonobstructive  2. Abnormal EKG  3. Cerebral infarction, unspecified  4. Chronic atrial fibrillation  5. DM, Type II  6. Hypercholesteremia, pure  7.  Hypertension, souleymane

## 2018-03-19 NOTE — ED PROVIDER NOTES
Patient Seen in: [facility] Emergency Department    History   Patient presents with:  Dyspnea ARLETTE SOB (respiratory)    Stated Complaint: arlette    HPI    Patient 25-year-old woman with medical problems outlined below coming with shortness of breath dys Eyes: Conjunctivae are normal. No scleral icterus. Neck: Normal range of motion. Neck supple. Cardiovascular: Intact distal pulses. Tachycardia irregularly irregular   Pulmonary/Chest: Effort normal. No respiratory distress. She has rales.    Abdom METABOLIC PANEL (14)   CBC WITH DIFFERENTIAL WITH PLATELET    Narrative: The following orders were created for panel order CBC WITH DIFFERENTIAL WITH PLATELET.   Procedure                               Abnormality         Status                     ----

## 2018-03-19 NOTE — CM/SW NOTE
03/19/18 1400   CM/SW Referral Data   Referral Source Physician   Reason for Referral Discharge planning   Informant Patient; Children   Patient Info   Patient's Mental Status Alert;Oriented   Patient's Home [facility]   Number of Levels in [facility]

## 2018-03-19 NOTE — PROGRESS NOTES
03/19/18 0155 03/19/18 0156 03/19/18 0158   Vital Signs   Pulse 108 113 125   Heart Rate Source Monitor Monitor Monitor   Resp 18 18 18   Respiratory Quality Normal Normal Normal   /70 152/72 145/73   BP Location Left arm Left arm Left arm   BP Me

## 2018-03-20 ENCOUNTER — APPOINTMENT (OUTPATIENT)
Dept: GENERAL RADIOLOGY | Facility: HOSPITAL | Age: 68
DRG: 166 | End: 2018-03-20
Attending: INTERNAL MEDICINE
Payer: MEDICARE

## 2018-03-20 PROCEDURE — 99232 SBSQ HOSP IP/OBS MODERATE 35: CPT | Performed by: INTERNAL MEDICINE

## 2018-03-20 PROCEDURE — 71046 X-RAY EXAM CHEST 2 VIEWS: CPT | Performed by: INTERNAL MEDICINE

## 2018-03-20 RX ORDER — LEVOFLOXACIN 5 MG/ML
750 INJECTION, SOLUTION INTRAVENOUS
Status: DISCONTINUED | OUTPATIENT
Start: 2018-03-20 | End: 2018-03-26

## 2018-03-20 RX ORDER — FUROSEMIDE 20 MG/1
20 TABLET ORAL
Status: DISCONTINUED | OUTPATIENT
Start: 2018-03-20 | End: 2018-03-23

## 2018-03-20 NOTE — PROGRESS NOTES
[facility]  Progress Note    Sabra Liz Patient Status:  Inpatient    1950 MRN [id number]   [facility] 8NE-A Attending Sage Valadez MD   Hosp Day # 1 PCP Cliff Olsen MD       Assessment:    · Shortness of breath  · [facility]   • furosemide  40 mg Intravenous BID (Diuretic)     • diltiazem 5 mg/hr (03/19/18 1257)         Shruthi Richardson MD  3/20/2018  8:31 AM

## 2018-03-20 NOTE — PHYSICAL THERAPY NOTE
Attempted to see pt at this time for Physical Therapy. Pt had just returned from chest x-ray and was eating breakfast. PT will reattempt at a later time.

## 2018-03-20 NOTE — PROGRESS NOTES
[facility]  Progress Note     Domi Reach Patient Status:  Inpatient    1950 MRN [id number]   [facility] 8NE-A Attending Rima Galvan MD   Hosp Day # 1 PCP Jonatan Yadav MD     Chief Complaint:  SOB     S: Patient 03/19/18   0004   TROP  <0.046            Imaging: Imaging data reviewed in Epic.     Medications:   • furosemide  20 mg Oral BID (Diuretic)   • aspirin  81 mg Oral Daily   • apixaban  5 mg Oral BID   • Losartan Potassium  25 mg Oral Daily   • insulin detem of care discussed with RN, pt      Luba Felipe, NP,   3/20 /2018  [id number]        Addendum:    Pt  Seen and examined. I agree with above. Pt s/o chills. She had low grade temp. Some cough.  Still on O2    General: NAD  CVS: IR IR   RS: crackles on ba

## 2018-03-20 NOTE — PROGRESS NOTES
[facility] Pharmacy Note:  Renal Adjustment for levofloxacin (Jessica Tilley)    Alessio Araujo is a 76year old female who has been prescribed levofloxacin (LEVAQUIN) 750 mg every 24 hrs.   CrCl is estimated creatinine clearance is 46.3 mL/min (based on SCr of 0.92 m

## 2018-03-20 NOTE — PAYOR COMM NOTE
--------------  ADMISSION REVIEW     Payor: [address] #:  [id number]  Authorization Number: [id number]    Admit date: 3/19/18  Admit time: [address]       Admitting Physician: Prudencio Buerger, MD  Attending Physician:  Boone Munoz MD (36.4 °C)  Temp src: Oral  SpO2: 92 %  O2 Device: None (Room air)[SA.2]    Current:[SA.1]/73 (BP Location: Left arm)   Pulse 125   Temp 98.1 °F (36.7 °C) (Oral)   Resp 18   Ht 157.5 cm (5' 2\")   Wt 68.3 kg   SpO2 94%   BMI 27.54 kg/m²[SA.2]     Phys PLATELET   HEMOGLOBIN Q5T   COMP METABOLIC PANEL (14)   CBC WITH DIFFERENTIAL WITH PLATELET   CBC W/ ANNE-MARIERD[KS.9]     EKG    Rate, intervals and axes as noted on EKG Report.   Rate: 102  Rhythm: Atrial Fibrillation  Reading: Atrial fibrillation rapid presents with shortness of breath. Patient states she has felt fatigued and short of breath for the last week. Symptoms now of dyspnea on exertion, worsened in the last 3 days. Patient admits to nonproductive cough. No fever. (+) lower ext edema.  Patient s Take 1 tablet (40 mg total) by mouth nightly.  Disp: 30 tablet Rfl: 11   lansoprazole 30 MG Oral Capsule Delayed Release Take 30 mg by mouth every morning before breakfast. Disp:  Rfl:    Glucose Blood (ONETOUCH ULTRA BLUE) In Vitro Strip Check 3 times ventura 44.8 mL/min (based on SCr of 0.95 mg/dL). Recent Labs   Lab  03/19/18   0004   PTP  20.7*   INR  1.71*     Recent Labs   Lab  03/19/18   0004   TROP  <0.046[PT.2]     Imaging: Imaging data reviewed in Epic. ASSESSMENT / PLAN:[PT.1]     1.  Acute heart f 3/19/2018 1927 Given 650 mg Oral Erika Herrera RN      apixaban Sam Austin) tab 5 mg     Date Action Dose Route User    3/20/2018 1037 Given 5 mg Oral [place]    3/19/2018 1839 Given 5 mg Oral Erika Herrera RN      aspirin chewable tab 81 mg

## 2018-03-20 NOTE — PAYOR COMM NOTE
--------------  CONTINUED STAY REVIEW    Payor: Mel  Subscriber #:  [id number]  Authorization Number: [id number]    Admit date: 3/19/18  Admit time: [address]    Admitting Physician: Dalila Ahumada, MD  Attending Physician:  Asad San MD Suyapa is expected to be discharge to: home   Leland Staton MD    [address] LAST 1 DAY:  acetaminophen (TYLENOL) tab 650 mg     Date Action Dose Route User    3/20/2018 1503 [facility]    3/20/2018 6247 Given Baljinder Bolanos RN          PLEASE REVIEW AND CONTINUE TO APPROVE ALL INPT DAYS     PLEASE FAX ALL INPT DAYS AS CERTIFIED ALONG W/NRD

## 2018-03-20 NOTE — PLAN OF CARE
CARDIOVASCULAR - ADULT    • Maintains optimal cardiac output and hemodynamic stability Progressing    • Absence of cardiac arrhythmias or at baseline Progressing      Tachycardia 90s-110s at rest. Cardizem drip discontinued this morning with no new cardiac

## 2018-03-20 NOTE — PLAN OF CARE
A&O X4. Afib 90s-110s at rest. Verbal order by Dr. Delia Abernathy to discontinue Cardizem drip. SpO2 low 90s on 3L NC (no home O2). . Low grade fever overnight. Other VSS. Lungs diminished/ coarse. SOB with exertion. PRN neb utilized. CXR completed.  C

## 2018-03-21 ENCOUNTER — APPOINTMENT (OUTPATIENT)
Dept: CT IMAGING | Facility: HOSPITAL | Age: 68
DRG: 166 | End: 2018-03-21
Attending: INTERNAL MEDICINE
Payer: MEDICARE

## 2018-03-21 ENCOUNTER — APPOINTMENT (OUTPATIENT)
Dept: PHYSICAL THERAPY | Age: 68
End: 2018-03-21
Attending: ORTHOPAEDIC SURGERY
Payer: MEDICARE

## 2018-03-21 ENCOUNTER — ANESTHESIA EVENT (OUTPATIENT)
Dept: ENDOSCOPY | Facility: HOSPITAL | Age: 68
DRG: 166 | End: 2018-03-21
Payer: MEDICARE

## 2018-03-21 PROCEDURE — 99233 SBSQ HOSP IP/OBS HIGH 50: CPT | Performed by: HOSPITALIST

## 2018-03-21 PROCEDURE — 71260 CT THORAX DX C+: CPT | Performed by: INTERNAL MEDICINE

## 2018-03-21 NOTE — PROGRESS NOTES
[facility] [address]  [place], [address]  ?  03/21/18  ? Re: Ambrocio Villegas  ? To Whom It May Concern:    Ambrocio Villegas was admitted to [facility] from 3/18/2018 to 03/21/18.    She remains hospitalized with a severe pulmon

## 2018-03-21 NOTE — PROGRESS NOTES
[facility]  Progress Note    Landon Mclain Patient Status:  Inpatient    1950 MRN [id number]   [facility] 8NE-A Attending Frances Rome MD   Hosp Day # 2 PCP Vasiliy Lerma MD       Assessment:    · Shortness of breath   · (03/20/18 4038)         Autumn Curry MD  3/21/2018  6:48 AM

## 2018-03-21 NOTE — PROGRESS NOTES
Will ask pulm to consult, given hx of likely sarcoid and now I have high suspicion for sarcoid pulmonary flare. Will likely need steroids. Continue levaquin for now. Sputum culture needed.     Raul Santos MD  [facility]  Internal Medicine [facility]

## 2018-03-21 NOTE — PHYSICAL THERAPY NOTE
PHYSICAL THERAPY TREATMENT NOTE - INPATIENT    Room Number: 8759/8801-G     Session: 1   Number of Visits to Meet Established Goals: 5    Presenting Problem: Acute Pulomary edema    History related to current admission:   Pt is 76year old female admitted Static Sitting: Good  Dynamic Sitting: Fair +           Static Standing: Fair +  Dynamic Standing: Fair +    ACTIVITY TOLERANCE  O2 Saturation at rest 9 pain in her R knee, after which, she went 75 ft back to her room. Pt stated she has \"knee problems. \"  Pt performed stand>sit to bedside chair ind and performed the BLE exercises listed below.  Pt updated on future goals for PT. RN notified of session find demonstrates ability to negotiate at least 1 flight of stairs at Modified independent.    Goal #5     Goal #6     Goal Comments: Goals established on 3/19/2018; ongoing as of 3/21/2018

## 2018-03-21 NOTE — ANESTHESIA PREPROCEDURE EVALUATION
PRE-OP EVALUATION    Patient Name: Ortiz Banks    Pre-op Diagnosis: Pneumonia, Sarcoidosis     Procedure(s):  BRONCHOSCOPY    Surgeon(s) and Role:     Sahara Marsh MD - Primary    Pre-op vitals reviewed.   Temp: 97.6 °F (36.4 °C)  Pulse: 80  Resp (FLUAD) ages 72 years and older inj 0.5ml 0.5 mL Intramuscular Prior to discharge   [COMPLETED] Potassium Chloride ER (K-DUR M20) CR tab 40 mEq 40 mEq Oral Q4H   ipratropium-albuterol (DUONEB) nebulizer solution 3 mL 3 mL Nebulization Q6H PRN       Outpati function. 2. Aortic valve: Trivial regurgitation. 3. Mitral valve: Mildly calcified annulus. Mitral valve demonstrates     non-specific thickening of the leaflets. 4. Left atrium: The left atrium was moderately dilated.   5. Right ventricle: Systolic pre 03/19/2018   HCT 35.5 03/19/2018   MCV 81.4 03/19/2018   MCH 26.4 (L) 03/19/2018   MCHC 32.4 03/19/2018   RDW 14.9 03/19/2018   .0 03/19/2018       Lab Results  Component Value Date    03/20/2018   K 4.0 03/20/2018    03/20/2018   CO2 25

## 2018-03-21 NOTE — CONSULTS
Pulmonary / Critical Care H&P/Consult       NAME: Cyndi Jacobson - ROOM: [address] [place] [address]- - MRN: [id number] - Age: 76year old - :  1950    Date of Admission: 3/18/2018 11:43 PM  Admission Diagnosis: Acute pulmonary edema ([facility] [address] [J81.0]  Hypoxia [R09.02] Onset   • Stroke Mother    • Stroke Sister         Home Medications:    Outpatient Prescriptions Marked as Taking for the 3/18/18 encounter [facility] Encounter):   METFORMIN HCL  MG Oral Tablet 24 Hr TAKE 1 TABLET BY MOUTH WITH BREAKFAST AND 2 TABLETS influenza virus vaccine PF, ipratropium-albuterol     REVIEW OF SYSTEMS:   Reviewed and negative except per HPI    OBJECTIVE:   03/20/18  2357 03/21/18  0415 03/21/18  0805 03/21/18  1029   BP: 143/91 153/82 139/83    BP Location: Left arm Left arm Left ar 12.0  11.5*   HCT  37.2  35.5   PLT  262.0  256.0     Recent Labs   Lab  03/19/18   0004   INR  1.71*         Recent Labs   Lab  03/19/18   0004  03/19/18   0432  03/19/18   2100  03/20/18   0437   NA  139  140   --   141   K  3.2*  3.0*  4.1  4.0   CL  10

## 2018-03-21 NOTE — PROGRESS NOTES
[facility]  Progress Note     Bina Cordero Patient Status:  Inpatient    1950 MRN [id number]   [facility] 8NE-A Attending Emre Manley MD   [facility] Day # 2 PCP Shannan Chaney MD     Chief Complaint:  SOB     S: Patient 0.92 mg/dL). Recent Labs   Lab  03/19/18   0004   PTP  20.7*   INR  1.71*       Recent Labs   Lab  03/19/18   0004   TROP  <0.046            Imaging: Imaging data reviewed in Epic.     Medications:   • DilTIAZem HCl  30 mg Oral 4 times per day   • furose better but still short of breath. ROS:   8 point ROS negative except for that stated in subjective.     Objective:  Constitutional: no signs of distress; vitals stable  Cardiovascular: regular rate and rhythm, no murmurs, rubs, or gallops  Respiratory: s

## 2018-03-22 ENCOUNTER — SURGERY (OUTPATIENT)
Age: 68
End: 2018-03-22

## 2018-03-22 ENCOUNTER — ANESTHESIA (OUTPATIENT)
Dept: ENDOSCOPY | Facility: HOSPITAL | Age: 68
DRG: 166 | End: 2018-03-22
Payer: MEDICARE

## 2018-03-22 PROCEDURE — 99233 SBSQ HOSP IP/OBS HIGH 50: CPT | Performed by: HOSPITALIST

## 2018-03-22 PROCEDURE — 0B9D8ZX DRAINAGE OF RIGHT MIDDLE LUNG LOBE, VIA NATURAL OR ARTIFICIAL OPENING ENDOSCOPIC, DIAGNOSTIC: ICD-10-PCS | Performed by: INTERNAL MEDICINE

## 2018-03-22 RX ORDER — LIDOCAINE HYDROCHLORIDE 20 MG/ML
INJECTION, SOLUTION INFILTRATION; PERINEURAL
Status: DISCONTINUED | OUTPATIENT
Start: 2018-03-22 | End: 2018-03-22 | Stop reason: HOSPADM

## 2018-03-22 RX ORDER — SODIUM CHLORIDE, SODIUM LACTATE, POTASSIUM CHLORIDE, CALCIUM CHLORIDE 600; 310; 30; 20 MG/100ML; MG/100ML; MG/100ML; MG/100ML
INJECTION, SOLUTION INTRAVENOUS CONTINUOUS
Status: DISCONTINUED | OUTPATIENT
Start: 2018-03-22 | End: 2018-03-22

## 2018-03-22 RX ORDER — DEXTROSE MONOHYDRATE 25 G/50ML
50 INJECTION, SOLUTION INTRAVENOUS
Status: DISCONTINUED | OUTPATIENT
Start: 2018-03-22 | End: 2018-03-22 | Stop reason: HOSPADM

## 2018-03-22 RX ORDER — NALOXONE HYDROCHLORIDE 0.4 MG/ML
80 INJECTION, SOLUTION INTRAMUSCULAR; INTRAVENOUS; SUBCUTANEOUS AS NEEDED
Status: DISCONTINUED | OUTPATIENT
Start: 2018-03-22 | End: 2018-03-22 | Stop reason: HOSPADM

## 2018-03-22 RX ORDER — BENZONATATE 200 MG/1
200 CAPSULE ORAL 3 TIMES DAILY PRN
Status: DISCONTINUED | OUTPATIENT
Start: 2018-03-22 | End: 2018-03-31

## 2018-03-22 NOTE — PAYOR COMM NOTE
--------------  CONTINUED STAY REVIEW    Payor: Mel  Subscriber #:  [id number]  Authorization Number: [id number]    Admit date: 3/19/18  Admit time: [address]    Admitting Physician: Dalila Ahumada, MD  Attending Physician:  Ryan Trimble MD cannula. Bronchoscope was inserted via transoral route. Normal vocal cord movement was noted. Trachea appeared normal. Kelly appeared normal.  Mucous membranes appeared slightly erythematous.  There were moderate secretions of a thin nature eminating fro 3/21/2018 1739 Given 20 mg Oral Akil Morley RN      Insulin Aspart Pen (NOVOLOG) 100 UNIT/ML flexpen 6 Units     Date Action Dose Route User    3/22/2018 1432 Given 6 Units Subcutaneous (Left Lower Abdomen) Jose D Payan RN    3/22/2018 1058

## 2018-03-22 NOTE — PROGRESS NOTES
[address] [facility] Patient Status:  Inpatient    1950 MRN [id number]   [facility] 8NE-A Attending Alcides Wills MD   Hosp Day # 3 PCP Jonatan Yadav MD     Pulm / Critical Care Progress Note     S: feels slightly b soft, non-tender, non-distended, positive BS. Extremity: no edema         No results for input(s): WBC, HGB, HCT, PLT in the last 72 hours. No results for input(s): INR in the last 72 hours.       Recent Labs   Lab  03/19/18   2100  03/20/18   0437   NA

## 2018-03-22 NOTE — OPERATIVE REPORT
Bronchoscopy procedure report    Preop diagnosis: abnl ct  Postop diagnosis:  same  Procedure performed: Bronchoscopy, Diagnostic  Bronchoalveolar lavage, BAL    Sedation used:  MAC    Description of procedure: Informed consent was obtained.  Oxygen applied

## 2018-03-22 NOTE — PROGRESS NOTES
[facility]  Progress Note    Ortiz Banks Patient Status:  Inpatient    1950 MRN [id number]   [facility] 8NE-A Attending Luiz Gaston MD   Hosp Day # 3 PCP Meliton Shah MD       Assessment:    · Shortness of breath  · H Daily Beta Blocker   • Pantoprazole Sodium  40 mg Oral [facility]           Nano Bejarano MD  3/22/2018  11:18 AM

## 2018-03-22 NOTE — ANESTHESIA POSTPROCEDURE EVALUATION
[address] [facility] Patient Status:  Inpatient   Age/Gender 76year old female MRN [id number]   Location [address] [facility]. Attending Luiz Gaston MD   [facility] Day # 3 PCP Meliton Shah MD       Anesthesia Post-op Note    Procedur

## 2018-03-22 NOTE — PROGRESS NOTES
[facility]  Progress Note     Catrinarandee Singhdrake Patient Status:  Inpatient    1950 MRN [id number]   [facility] 8NE-A Attending Marcello Damon MD   [facility] Day # 3 PCP Elidia Garcia MD     Chief Complaint:  SOB     S: Patient rate-related heart failure- felt to be more likely sarcoid flair than HF   1. Lasix 20 mg po BID    2. ECHO preserved LVEF    3. Daily weight  4. Cardiology following   2. Atrial fibrillation on Eliquis, rate better   1. Metoprolol, cozaar   2. Eliquis  3. Dexter Rogers, [address]  Internal Medicine Hospitalist  Pager [phone]  Cell [phone]

## 2018-03-23 ENCOUNTER — APPOINTMENT (OUTPATIENT)
Dept: PHYSICAL THERAPY | Age: 68
End: 2018-03-23
Attending: ORTHOPAEDIC SURGERY
Payer: MEDICARE

## 2018-03-23 PROCEDURE — 99232 SBSQ HOSP IP/OBS MODERATE 35: CPT | Performed by: HOSPITALIST

## 2018-03-23 RX ORDER — METHYLPREDNISOLONE SODIUM SUCCINATE 125 MG/2ML
60 INJECTION, POWDER, LYOPHILIZED, FOR SOLUTION INTRAMUSCULAR; INTRAVENOUS EVERY 6 HOURS
Status: DISCONTINUED | OUTPATIENT
Start: 2018-03-23 | End: 2018-03-25

## 2018-03-23 RX ORDER — POTASSIUM CHLORIDE 20 MEQ/1
40 TABLET, EXTENDED RELEASE ORAL ONCE
Status: COMPLETED | OUTPATIENT
Start: 2018-03-23 | End: 2018-03-23

## 2018-03-23 RX ORDER — FUROSEMIDE 20 MG/1
20 TABLET ORAL DAILY
Status: DISCONTINUED | OUTPATIENT
Start: 2018-03-24 | End: 2018-03-31

## 2018-03-23 NOTE — PHYSICAL THERAPY NOTE
Attempted to see Pt this PM for PT session - Pt just returned from [facility], and requesting to rest.  Pt stated, \"I will feel better once I start my steriod, maybe someone can come tomorrow? \". Discussed with Pt ambulating with nursing staff TID.   Pt in

## 2018-03-23 NOTE — PROGRESS NOTES
[address] [facility] Patient Status:  Inpatient    1950 MRN [id number]   [facility] 8NE-A Attending Rolando Decker MD   [facility] Day # 4 PCP Jordana Amaya MD     Pulm / Critical Care Progress Note      S: Temp to 102.6 y positive BS. Extremity: no edema       Recent Labs   Lab  03/23/18   0457   WBC  10.7   HGB  10.9*   HCT  35.8   PLT  249.0     No results for input(s): INR in the last 72 hours.       Recent Labs   Lab  03/23/18   0457   NA  139   K  3.8   CL  106   CO2

## 2018-03-23 NOTE — PLAN OF CARE
Received bedside report on this Pt. At 1915. Pt. Awake, A&Ox4, calm and cooperative. AFib on Tele monitor, sats greater than 92% on 4 L Oxygen per NC.  Pt. Has frequent non-productive cough, asking for Tessalon Pearls, paged and spoke with Dr. Louise Raphael

## 2018-03-23 NOTE — PROGRESS NOTES
[facility] Cardiology Progress Note    Subjective: Bothered by continuing cough.      Objective:  /71 (BP Location: Left arm)   Pulse 91   Temp 97.7 °F (36.5 °C) (Oral)   Resp 20   Ht 157.5 cm (5' 2\")   Wt 145 lb 1 oz (65.8 kg)   SpO2 94%   BMI 26.53 k

## 2018-03-24 PROCEDURE — 99233 SBSQ HOSP IP/OBS HIGH 50: CPT | Performed by: HOSPITALIST

## 2018-03-24 RX ORDER — DILTIAZEM HYDROCHLORIDE 180 MG/1
180 CAPSULE, EXTENDED RELEASE ORAL DAILY
Status: DISCONTINUED | OUTPATIENT
Start: 2018-03-24 | End: 2018-03-27

## 2018-03-24 NOTE — PROGRESS NOTES
[address] [facility] Patient Status:  Inpatient    1950 MRN [id number]   [facility] 8NE-A Attending Jimmy Vazquez MD   [facility] Day # 5 PCP Anne Mason MD     SUBJECTIVE: has many questions. Overall feels better.   No Oral, Q15 Min PRN **OR** dextrose 50% injection 50 mL, 50 mL, Intravenous, Q15 Min PRN **OR** glucose (DEX4) oral liquid 30 g, 30 g, Oral, Q15 Min PRN **OR** Glucose-Vitamin C (DEX-4) 4-0.006 g chewable tab 8 tablet, 8 tablet, Oral, Q15 Min PRN  •  acetami 5 months starting 7/2017 but was intolerant and tapered prematurely  - IV steroid burst initiated   - Eventually may need steroid sparing agent such as MTX or Imuran  - esr / crp indicate significant inflammatory disease  4. proph- anticoagulated on eliqui

## 2018-03-24 NOTE — PLAN OF CARE
Patient aox3, vss,3l nc, lungs diminished. +2 pedals and radials. Positive bowel sounds. No edema. QID gluc. Afib controlled. Patient is on solumedrol for sarcoidosis. BS elevated, hospitalist increased novolog ss and CHO ratio and Levemir 15 units at hs.

## 2018-03-24 NOTE — PROGRESS NOTES
[facility]  Progress Note     Jose Smith Patient Status:  Inpatient    1950 MRN [id number]   [facility] 8NE-A Attending Elizabeth Valadez MD   [facility] Day # 5 PCP Becky Callahan MD     Chief Complaint: dyspnea    S: Patient Beta Blocker   • Pantoprazole Sodium  40 mg Oral QAM AC       ASSESSMENT / PLAN:     1. Acute resp failure  1. Wean O2 as able  2. Acute Sarcoid flare  1. Steroids per pulm  3. PNA  1. Cont. levaquin  4. A. Fib w/ RVR  1. HR still up with ambulation  2.  Co

## 2018-03-25 ENCOUNTER — APPOINTMENT (OUTPATIENT)
Dept: GENERAL RADIOLOGY | Facility: HOSPITAL | Age: 68
DRG: 166 | End: 2018-03-25
Attending: INTERNAL MEDICINE
Payer: MEDICARE

## 2018-03-25 PROCEDURE — 71045 X-RAY EXAM CHEST 1 VIEW: CPT | Performed by: INTERNAL MEDICINE

## 2018-03-25 PROCEDURE — 99233 SBSQ HOSP IP/OBS HIGH 50: CPT | Performed by: HOSPITALIST

## 2018-03-25 RX ORDER — DILTIAZEM HYDROCHLORIDE 5 MG/ML
10 INJECTION INTRAVENOUS EVERY 2 HOUR PRN
Status: DISCONTINUED | OUTPATIENT
Start: 2018-03-25 | End: 2018-03-31

## 2018-03-25 RX ORDER — METHYLPREDNISOLONE SODIUM SUCCINATE 125 MG/2ML
125 INJECTION, POWDER, LYOPHILIZED, FOR SOLUTION INTRAMUSCULAR; INTRAVENOUS EVERY 6 HOURS
Status: DISCONTINUED | OUTPATIENT
Start: 2018-03-25 | End: 2018-03-29

## 2018-03-25 NOTE — PROGRESS NOTES
[facility] Patient Status:  Inpatient    1950 MRN [id number]   [facility] 8NE-A Attending Jimmy Vazquez MD   [address] Day # 6 PCP Anne Mason MD     Critical Care Progress Note     Date of Admission: 3/18/20 mL, Intravenous, Q15 Min PRN **OR** glucose (DEX4) oral liquid 30 g, 30 g, Oral, Q15 Min PRN **OR** Glucose-Vitamin C (DEX-4) 4-0.006 g chewable tab 8 tablet, 8 tablet, Oral, Q15 Min PRN  •  acetaminophen (TYLENOL) tab 650 mg, 650 mg, Oral, Q6H PRN  •  ond ventilator b/c of COPD and she does not want intubation/ventilator support under any circumstance. She has spoken to her daughter and they are in agreement.   However, she would still want other aspects of care including cardioversion, if needed- order for

## 2018-03-25 NOTE — PROGRESS NOTES
Endocrinology was consulted per intensivist to manage blood glucose as sugars have been elevated and the steroid dosing had been increased. Spoke with hospitalist, Dr. Libia Cortes about update and informed him of new consult.  Per MD, he would like to [employer]

## 2018-03-25 NOTE — PHYSICAL THERAPY NOTE
IP PT on hold 2/2 worsening of resp status and subsequent t/f to ICU. Will require new PT orders to resume skilled PT intervention when appropriate.

## 2018-03-25 NOTE — PROGRESS NOTES
[facility]  Progress Note     Rip Salgado Patient Status:  Inpatient    1950 MRN [id number]   [facility] 8NE-A Attending Joseline Wilson MD   [address] Day # 6 PCP Gabino Durham MD     Chief Complaint: dyspnea    S: Patient (Human) (Isophane)  15 Units Subcutaneous Q12H   • DilTIAZem HCl ER Coated Beads  180 mg Oral Daily   • furosemide  20 mg Oral Daily   • Insulin Aspart Pen  1-68 Units Subcutaneous TID CC   • Insulin Aspart Pen  1-68 Units Subcutaneous TID CC and HS   • le

## 2018-03-25 NOTE — PLAN OF CARE
Pt is now on Vapotherm and RR improved to 24 from 36 as well as work of breathing. She was anxious til sitting with her and she was able to calm her down. She is tolerating the vapotherm at this point and sats up to % but CXR is worsening.

## 2018-03-25 NOTE — RESPIRATORY THERAPY NOTE
RT called to bedside for labored breathing and low saturation at 77%. Duoneb initiated while RT set up non rebreather mask. Saturation improved to 88%.  ABG was immediately drawn on 15 LPM Non re breather:   ABG pH 7.43    ABG pCO2 36 mm Hg    ABG pO2 55 (L

## 2018-03-26 ENCOUNTER — APPOINTMENT (OUTPATIENT)
Dept: GENERAL RADIOLOGY | Facility: HOSPITAL | Age: 68
DRG: 166 | End: 2018-03-26
Attending: INTERNAL MEDICINE
Payer: MEDICARE

## 2018-03-26 PROCEDURE — 99233 SBSQ HOSP IP/OBS HIGH 50: CPT | Performed by: HOSPITALIST

## 2018-03-26 PROCEDURE — 71045 X-RAY EXAM CHEST 1 VIEW: CPT | Performed by: INTERNAL MEDICINE

## 2018-03-26 RX ORDER — POTASSIUM CHLORIDE 20 MEQ/1
40 TABLET, EXTENDED RELEASE ORAL ONCE
Status: DISCONTINUED | OUTPATIENT
Start: 2018-03-26 | End: 2018-03-31

## 2018-03-26 NOTE — PROGRESS NOTES
Critical Care Progress Note        NAME: Samuel Bailey - ROOM: 466/466-A - MRN: [id number] - Age: 76year old - : 1950  Date of Admission: 3/18/2018 11:43 PM  Admission Diagnosis: Acute pulmonary edema ([facility] [address] [J81.0]  Hypoxia [R09.02]      SUBJECTI HCl, glucose **OR** Glucose-Vitamin C **OR** dextrose 50% **OR** glucose **OR** Glucose-Vitamin C, acetaminophen, ondansetron HCl, influenza virus vaccine PF, ipratropium-albuterol     Lungs: bronchophony bibasilar  Heart: irregularly irregular rhythm  Abd will stop after today's dose  2. afib- mgt per cards  3.  Sarcoidosis now with exacerbation and significant interstitial lung parenchymal involvement- seems to have progressed to ARDS-type picture  - cont solumedrol 125mg q6hrs   - Eventually may need stero

## 2018-03-26 NOTE — PLAN OF CARE
PT A/O, 91% AT REST ON VAPOTHERM 90%, DESATS QUICKLY ON RA OR WITH ANY ACTIVITY, LUNGS DIMINISHED , CRACKLES, SLIGHT INSP WHEEZE AT TIMES, PRODUCTIVE COUGH, AFIB, HR 90'S, VOIDING IN BEDPAN, HOB UP 30 DEGREES, ACCUCHECK 55 AT 2100, GIVEN 15GM GLUCOSE/SNACK

## 2018-03-26 NOTE — PROGRESS NOTES
[facility]  Progress Note     Berta Fry Patient Status:  Inpatient    1950 MRN [id number]   [facility] 8NE-A Attending Laura Reynolds MD   [facility] Day # 7 PCP Omid Clark MD     Chief Complaint: dyspnea    S: Patient Insulin Aspart Pen  1-68 Units Subcutaneous TID CC   • Insulin Aspart Pen  1-68 Units Subcutaneous TID CC and HS   • aspirin  81 mg Oral Daily   • apixaban  5 mg Oral BID   • Losartan Potassium  25 mg Oral Daily   • Metoprolol Succinate ER  50 mg Oral Girish

## 2018-03-26 NOTE — PLAN OF CARE
Pt refused her potassium coverage today. Attempted multiple times to give it to her but she did not take it.

## 2018-03-26 NOTE — PHYSICAL THERAPY NOTE
Chart reviewed. Pt t/f to ICU on 3/25/18 due to acute hypoxic respiratory failure due to suspected sarcoidosis exacerbation per Dr. Tanner Alexander note of 3/25/18. Pt will need new inpt PT orders to resume inpt PT when appropriate. Will discuss at ICU rounds.

## 2018-03-26 NOTE — PLAN OF CARE
CARDIOVASCULAR - ADULT    • Maintains optimal cardiac output and hemodynamic stability Not Progressing        Impaired Functional Mobility    • Achieve highest/safest level of mobility/gait Not Progressing        METABOLIC/FLUID AND ELECTROLYTES - ADULT

## 2018-03-27 PROCEDURE — 99233 SBSQ HOSP IP/OBS HIGH 50: CPT | Performed by: HOSPITALIST

## 2018-03-27 RX ORDER — DILTIAZEM HYDROCHLORIDE 240 MG/1
240 CAPSULE, COATED, EXTENDED RELEASE ORAL DAILY
Status: DISCONTINUED | OUTPATIENT
Start: 2018-03-28 | End: 2018-03-31

## 2018-03-27 RX ORDER — FLUTICASONE PROPIONATE 50 MCG
2 SPRAY, SUSPENSION (ML) NASAL DAILY
Status: DISCONTINUED | OUTPATIENT
Start: 2018-03-27 | End: 2018-03-31

## 2018-03-27 RX ORDER — ECHINACEA PURPUREA EXTRACT 125 MG
1 TABLET ORAL
Status: DISCONTINUED | OUTPATIENT
Start: 2018-03-27 | End: 2018-03-31

## 2018-03-27 RX ORDER — POTASSIUM CHLORIDE 20 MEQ/1
40 TABLET, EXTENDED RELEASE ORAL EVERY 4 HOURS
Status: COMPLETED | OUTPATIENT
Start: 2018-03-27 | End: 2018-03-27

## 2018-03-27 NOTE — CM/SW NOTE
Complex Care Assessment    Orestes Elizabeth Sandymiladis Patient Status:  Inpatient   Age/Gender 76year old female MRN [id number]   [facility] 4SW-A Attending Patrecia Cooks, MD   [address] Day # 8 PCP Ronan Pruitt MD     Assessment Type: Initial    C

## 2018-03-27 NOTE — PROGRESS NOTES
Critical Care Progress Note        NAME: Jorge Tucker - ROOM: 466/466-A - MRN: [id number] - Age: 76year old - : 1950  Date of Admission: 3/18/2018 11:43 PM  Admission Diagnosis: Acute pulmonary edema ([facility] [address] [J81.0]  Hypoxia [R09.02]      SUBJECTI Medication:DilTIAZem HCl, benzonatate, magnesium hydroxide, TraMADol HCl, glucose **OR** Glucose-Vitamin C **OR** dextrose 50% **OR** glucose **OR** Glucose-Vitamin C, acetaminophen, ondansetron HCl, influenza virus vaccine PF, ipratropium-albuterol     Erica negative, RVP negative, PJP from BAL negative   - finished 7 days of levaquin, monitoring off Abx  -wean vapotherm as tolerated- currently not tolerating anything less than 90%  2. afib- mgt per cards  3.  Sarcoidosis now with exacerbation and significant i

## 2018-03-27 NOTE — PAYOR COMM NOTE
--------------  CONTINUED STAY REVIEW    Payor: Mel  Subscriber #:  [id number]  Authorization Number: [id number]    Admit date: 3/19/18  Admit time: [address]    Admitting Physician: Prudencio Buerger, MD  Attending Physician:  Ros Rush MD ELI Juarez      diltiazem (CARDIZEM CD) 24 hr cap 180 mg     Date Action Dose Route User    3/27/2018 0824 Given 180 mg Oral Sinan Ku RN      furosemide (LASIX) tab 20 mg     Date Action Dose Route User    3/27/2018 0825 Given 20 mg Oral Marchet (PROTONIX) EC tab 40 mg     Date Action Dose Route User    3/27/2018 0610 Given 40 mg Oral Jaleel Pringle RN      Potassium Chloride ER (K-DUR M20) CR tab 40 mEq     Date Action Dose Route User    3/27/2018 0824 Given 40 mEq Oral Noel Lloyd RN cultures. - has demonstrated rapid deterioration over the last 24 hrs.   Now on very high vapotherm support  - pt stated that she watched her  die while on ventilator b/c of COPD and she does not want intubation/ventilator support under any circumst levaquin, monitoring off Abx  -wean vapotherm as tolerated- currently not tolerating anything less than 90%  2. afib- mgt per cards  3.  Sarcoidosis now with exacerbation and significant interstitial lung parenchymal involvement- seems to have progressed to

## 2018-03-27 NOTE — PROGRESS NOTES
JUDITH HOSPITALIST  Progress Note     Catrina Sapp Patient Status:  Inpatient    1950 MRN [id number]   [facility] 8NE-A Attending Kayla Vazquez MD   [facility] Day # 8 PCP Elidia Garcia MD     Chief Complaint: dyspnea    S: Patient Subcutaneous Nightly   • [START ON 3/28/2018] Insulin NPH (Human) (Isophane)  22 Units Subcutaneous Daily with breakfast   • [START ON 3/28/2018] DilTIAZem HCl ER Coated Beads  240 mg Oral Daily   • Potassium Chloride ER  40 mEq Oral Once   • MethylPREDNIS

## 2018-03-27 NOTE — PLAN OF CARE
RESPIRATORY - ADULT    • Achieves optimal ventilation and oxygenation Not Progressing          CARDIOVASCULAR - ADULT    • Maintains optimal cardiac output and hemodynamic stability Progressing        METABOLIC/FLUID AND ELECTROLYTES - ADULT    • Glucose m

## 2018-03-28 ENCOUNTER — APPOINTMENT (OUTPATIENT)
Dept: GENERAL RADIOLOGY | Facility: HOSPITAL | Age: 68
DRG: 166 | End: 2018-03-28
Attending: NURSE PRACTITIONER
Payer: MEDICARE

## 2018-03-28 ENCOUNTER — APPOINTMENT (OUTPATIENT)
Dept: CV DIAGNOSTICS | Facility: HOSPITAL | Age: 68
DRG: 166 | End: 2018-03-28
Attending: INTERNAL MEDICINE
Payer: MEDICARE

## 2018-03-28 PROCEDURE — 93306 TTE W/DOPPLER COMPLETE: CPT | Performed by: INTERNAL MEDICINE

## 2018-03-28 PROCEDURE — 71045 X-RAY EXAM CHEST 1 VIEW: CPT | Performed by: NURSE PRACTITIONER

## 2018-03-28 PROCEDURE — 99233 SBSQ HOSP IP/OBS HIGH 50: CPT | Performed by: HOSPITALIST

## 2018-03-28 RX ORDER — POTASSIUM CHLORIDE 20 MEQ/1
40 TABLET, EXTENDED RELEASE ORAL ONCE
Status: COMPLETED | OUTPATIENT
Start: 2018-03-28 | End: 2018-03-28

## 2018-03-28 RX ORDER — AZATHIOPRINE 50 MG/1
50 TABLET ORAL DAILY
Status: DISCONTINUED | OUTPATIENT
Start: 2018-03-28 | End: 2018-03-31

## 2018-03-28 RX ORDER — SENNA AND DOCUSATE SODIUM 50; 8.6 MG/1; MG/1
2 TABLET, FILM COATED ORAL 2 TIMES DAILY
Status: DISCONTINUED | OUTPATIENT
Start: 2018-03-28 | End: 2018-03-31

## 2018-03-28 NOTE — PROGRESS NOTES
JUDITH HOSPITALIST  Progress Note     Mira Reynoso Patient Status:  Inpatient    1950 MRN [id number]   [facility] 8NE-A Attending Patrecia Cooks, MD   [address] Day # 9 PCP Ronan Pruitt MD     Chief Complaint: dyspnea    S: Patient Daily with breakfast   • DilTIAZem HCl ER Coated Beads  240 mg Oral Daily   • Potassium Chloride ER  40 mEq Oral Once   • MethylPREDNISolone Sodium Succ  125 mg Intravenous Q6H   • furosemide  20 mg Oral Daily   • Insulin Aspart Pen  1-68 Units Subcutaneou significant changes compared to last echo   Imuran   50 mg daily   BS still spiking after am steroids   Cont slow wean vapotherm  meds to help w/ constipation  Cont to try to increase activity sat on edge bed very briefly today   Denies any headache today

## 2018-03-28 NOTE — PROGRESS NOTES
[address] [facility] Patient Status:  Inpatient    1950 MRN [id number]   [facility] 4SW-A Attending Daniel Solis MD   [facility] Day # 9 PCP Shannan Chaney MD     Pulm / Critical Care Progress Note     S: feels a little b 2020 [P.O.:2020]  Out: 1400 [Urine:1400]     Physical Exam:   General: alert, cooperative, on vapotherm. Head: Normocephalic, without obvious abnormality, atraumatic. Lungs: slightly coarse bs   Chest wall: No tenderness or deformity.    Heart: Regular hyperglycemia  - monitor sugars closely and increase insulin accordingly  5. F/E/N- diet as tolerated, lytes prn  6. proph- anticoagulated on eliquis  7.  Dispo- DNI but cardioversion acceptable  -optimistic that lungs will start to improve however it is co

## 2018-03-28 NOTE — CDS QUERY
Melanie Rodriguez  Dear Doctor  Clinical information (provided below) indicates an unknown status of a documented diagnosis.  For accurate ICD-10-CM code assignment to reflect severity of illness and risk of mortality,    PLEASE (X)

## 2018-03-28 NOTE — PLAN OF CARE
Impaired Functional Mobility    • Achieve highest/safest level of mobility/gait Not Progressing        RESPIRATORY - ADULT    • Achieves optimal ventilation and oxygenation Not Progressing          CARDIOVASCULAR - ADULT    • Maintains optimal cardiac outp

## 2018-03-28 NOTE — PAYOR COMM NOTE
--------------  CONTINUED STAY REVIEW    Payor: Mel  Subscriber #:  [id number]  Authorization Number: [id number]    Admit date: 3/19/18  Admit time: [address]    Admitting Physician: Alexus Carrion MD  Attending Physician:  Brandie Neal MD Units     Date Action Dose Route User    3/28/2018 1253 Given 20 Units Subcutaneous (Right Upper Arm) Jonathan Flores RN    3/28/2018 0063 Given 5 Units Subcutaneous (Left Lower Abdomen) Krissy Domingo RN      Insulin NPH (Human) (Isophane) (HUMULIN N,N Date Action Dose Route User    3/28/2018 1059 Given 2 tablet Oral Valeria Domingo, RN          Plan: 3/28  1. acute hypoxic resp failure: due to suspected sarcoidosis exacerbation now w/ appearance of ALI vs ARDS  - s/p bronch, initial labs not c/w infe

## 2018-03-29 ENCOUNTER — APPOINTMENT (OUTPATIENT)
Dept: GENERAL RADIOLOGY | Facility: HOSPITAL | Age: 68
DRG: 166 | End: 2018-03-29
Attending: NURSE PRACTITIONER
Payer: MEDICARE

## 2018-03-29 PROCEDURE — 99232 SBSQ HOSP IP/OBS MODERATE 35: CPT | Performed by: HOSPITALIST

## 2018-03-29 PROCEDURE — 71045 X-RAY EXAM CHEST 1 VIEW: CPT | Performed by: NURSE PRACTITIONER

## 2018-03-29 RX ORDER — POTASSIUM CHLORIDE 20 MEQ/1
40 TABLET, EXTENDED RELEASE ORAL ONCE
Status: COMPLETED | OUTPATIENT
Start: 2018-03-29 | End: 2018-03-29

## 2018-03-29 RX ORDER — METHYLPREDNISOLONE SODIUM SUCCINATE 125 MG/2ML
80 INJECTION, POWDER, LYOPHILIZED, FOR SOLUTION INTRAMUSCULAR; INTRAVENOUS EVERY 6 HOURS
Status: DISCONTINUED | OUTPATIENT
Start: 2018-03-29 | End: 2018-03-31

## 2018-03-29 NOTE — PROGRESS NOTES
Critical Care Progress Note     Assessment / Plan:  1.  Acute hypoxic resp failure: due to suspected sarcoidosis exacerbation now w/ appearance of ARDS  -s/p bronch, initial labs not c/w infection, PCT negative, RVP negative, PJP from BAL negative   -finish

## 2018-03-29 NOTE — PROGRESS NOTES
JUDITH HOSPITALIST  Progress Note     Levell Cosmo Patient Status:  Inpatient    1950 MRN [id number]   [facility] 8NE-A Attending Roswell Sicard, MD   [address] Day # 10 PCP Rohit Osorio MD     Chief Complaint: dyspnea    S: Patient 20 Units Subcutaneous Daily with breakfast   • Insulin NPH (Human) (Isophane)  15 Units Subcutaneous Nightly   • Senna-Docusate Sodium  2 tablet Oral BID   • azathioprine  50 mg Oral Daily   • Fluticasone Propionate  2 spray Each Nare Daily   • DilTIAZem discussed with patient  RN   Val hSetty NP     Addendum:    Agree with above note. Pt. Seen and examined.     Gen: NAD  CVS: s1s2  Resp: course  Abd: soft    -slowly seems to be improving  -steroids decreased  -dec insulin  -monitor closely    [facility]

## 2018-03-30 ENCOUNTER — APPOINTMENT (OUTPATIENT)
Dept: GENERAL RADIOLOGY | Facility: HOSPITAL | Age: 68
DRG: 166 | End: 2018-03-30
Attending: NURSE PRACTITIONER
Payer: MEDICARE

## 2018-03-30 PROCEDURE — 71045 X-RAY EXAM CHEST 1 VIEW: CPT | Performed by: NURSE PRACTITIONER

## 2018-03-30 PROCEDURE — 99232 SBSQ HOSP IP/OBS MODERATE 35: CPT | Performed by: HOSPITALIST

## 2018-03-30 RX ORDER — POTASSIUM CHLORIDE 20 MEQ/1
40 TABLET, EXTENDED RELEASE ORAL ONCE
Status: COMPLETED | OUTPATIENT
Start: 2018-03-30 | End: 2018-03-30

## 2018-03-30 RX ORDER — BISACODYL 10 MG
10 SUPPOSITORY, RECTAL RECTAL
Status: DISCONTINUED | OUTPATIENT
Start: 2018-03-30 | End: 2018-03-31

## 2018-03-30 NOTE — PROGRESS NOTES
Critical Care Progress Note        NAME: Jorge Tucker - ROOM: 466/466-A - MRN: [id number] - Age: 76year old - : 1950  Date of Admission: 3/18/2018 11:43 PM  Admission Diagnosis: Acute pulmonary edema ([facility] [address] [J81.0]  Hypoxia [R09.02]      SUBJECTI mg Oral Daily Beta Blocker   • Pantoprazole Sodium  40 mg Oral QAM AC     Continuous Infusing Medication:  PRN Medication:Saline Nasal Spray, DilTIAZem HCl, benzonatate, magnesium hydroxide, TraMADol HCl, glucose **OR** Glucose-Vitamin C **OR** dextrose 50 Date/Time Value Ref Range Status   06/17/2014 08:45 AM 3.4 (L) 3.5 - 4.8 g/dL Final   ----------      Imaging: chest x-ray reviewed- stable over the past few days, improvement noted a few days back    ASSESSMENT/PLAN:  1.  Acute hypoxic resp failure: due

## 2018-03-30 NOTE — PAYOR COMM NOTE
--------------  CONTINUED STAY REVIEW    Payor: Mel  Subscriber #:  [id number]  Authorization Number: [id number]    Admit date: 3/19/18  Admit time: [address]    Admitting Physician: Seamus Payne MD  Attending Physician:  Sada Desir MD cardizem  2. eliquis  5. Acute CHF pEF  1. Now euvolemic  2. Cont. PO lasix  3. Echo  Preserved LVEF 55% no significant changes to last echo     6. T2DM-   1. Inc glu due to steroids  2. NPH 20/15  units   Am/ pm coverage  W/ reduction in steroids   3.  ISS Coated Beads (CARDIZEM CD) 24 hr cap 240 mg     Date Action Dose Route User    3/30/2018 0904 Given 240 mg Oral Elidia Calixto, ELI      Fluticasone Propionate (FLONASE) 50 MCG/ACT nasal spray 2 spray     Date Action Dose Route User    3/30/2018 3570 Given ER (Toprol XL) 24 hr tab 50 mg     Date Action Dose Route User    3/30/2018 0605 Given 50 mg Oral Tito Campuzano RN      Pantoprazole Sodium (PROTONIX) EC tab 40 mg     Date Action Dose Route User    3/30/2018 2918 Given 40 mg Oral Tito Campuzano,

## 2018-03-30 NOTE — PROGRESS NOTES
JUDITH HOSPITALIST  Progress Note     Rip Salgado Patient Status:  Inpatient    1950 MRN [id number]   [facility] 8NE-A Attending Joseline Wilson MD   [facility] Day # 6 PCP Gabino Durham MD     Chief Complaint: dyspnea    S: Patient Daily   • Fluticasone Propionate  2 spray Each Nare Daily   • DilTIAZem HCl ER Coated Beads  240 mg Oral Daily   • Potassium Chloride ER  40 mEq Oral Once   • furosemide  20 mg Oral Daily   • Insulin Aspart Pen  1-68 Units Subcutaneous TID CC   • Insulin A with patient  RN  Dr Lucas Olivier, NP         Addendum:    Agree with above note. Pt. Seen and examined. Gen: NAD  CVS: s1s2  Resp: rhonchi  Abd: soft    -cont. Steroids  -cont.  vapotherm  -add bipap at night    Tequila Luke MD

## 2018-03-30 NOTE — CM/SW NOTE
Received call from [place] [facility] with pt's Itaro. [place] [facility] advises is pt's care coordinator. Contact phone: [phone]    Currently with ongoing high O2 needs . [address], 03/30/18, 11:15 AM

## 2018-03-31 VITALS
OXYGEN SATURATION: 97 % | HEART RATE: 71 BPM | RESPIRATION RATE: 19 BRPM | HEIGHT: 62 IN | TEMPERATURE: 98 F | WEIGHT: 150.13 LBS | DIASTOLIC BLOOD PRESSURE: 72 MMHG | BODY MASS INDEX: 27.63 KG/M2 | SYSTOLIC BLOOD PRESSURE: 126 MMHG

## 2018-03-31 PROCEDURE — 99239 HOSP IP/OBS DSCHRG MGMT >30: CPT | Performed by: HOSPITALIST

## 2018-03-31 RX ORDER — IPRATROPIUM BROMIDE AND ALBUTEROL SULFATE 2.5; .5 MG/3ML; MG/3ML
3 SOLUTION RESPIRATORY (INHALATION) EVERY 6 HOURS PRN
Refills: 0 | Status: SHIPPED | COMMUNITY
Start: 2018-03-31

## 2018-03-31 RX ORDER — ALPRAZOLAM 0.25 MG/1
0.25 TABLET ORAL NIGHTLY PRN
Refills: 0 | Status: SHIPPED | COMMUNITY
Start: 2018-03-31

## 2018-03-31 RX ORDER — DILTIAZEM HYDROCHLORIDE 240 MG/1
240 CAPSULE, COATED, EXTENDED RELEASE ORAL DAILY
Refills: 0 | Status: SHIPPED | COMMUNITY
Start: 2018-04-01

## 2018-03-31 RX ORDER — ONDANSETRON 2 MG/ML
4 INJECTION INTRAMUSCULAR; INTRAVENOUS EVERY 6 HOURS PRN
Refills: 0 | Status: SHIPPED | COMMUNITY
Start: 2018-03-31

## 2018-03-31 RX ORDER — FUROSEMIDE 20 MG/1
20 TABLET ORAL DAILY
Refills: 0 | Status: SHIPPED | COMMUNITY
Start: 2018-04-01

## 2018-03-31 RX ORDER — ALPRAZOLAM 0.25 MG/1
0.25 TABLET ORAL NIGHTLY PRN
Status: DISCONTINUED | OUTPATIENT
Start: 2018-03-31 | End: 2018-03-31

## 2018-03-31 RX ORDER — SENNA AND DOCUSATE SODIUM 50; 8.6 MG/1; MG/1
2 TABLET, FILM COATED ORAL 2 TIMES DAILY
Refills: 0 | Status: SHIPPED | COMMUNITY
Start: 2018-03-31

## 2018-03-31 RX ORDER — AZATHIOPRINE 50 MG/1
50 TABLET ORAL DAILY
Refills: 0 | Status: SHIPPED | COMMUNITY
Start: 2018-04-01

## 2018-03-31 RX ORDER — METHYLPREDNISOLONE SODIUM SUCCINATE 40 MG/ML
80 INJECTION, POWDER, LYOPHILIZED, FOR SOLUTION INTRAMUSCULAR; INTRAVENOUS EVERY 6 HOURS
Refills: 0 | Status: SHIPPED | COMMUNITY
Start: 2018-03-31

## 2018-03-31 NOTE — PLAN OF CARE
Received pt this am aox4. Resting comfortably in bed. On vapotherm. desats with movement and to bedpan. Family at bedside this morning and afternoon.      Impaired Functional Mobility    • Achieve highest/safest level of mobility/gait Not Progressing

## 2018-03-31 NOTE — PLAN OF CARE
EMS arrived to take patient to [facility] @ ~ 1800; left around 1815 pm. Daughter Ericka at bedside and to ride along in EMS. Imaging on disk and sent with patient. Pt comfortable at dc. All questions answered. Report to [facility].

## 2018-03-31 NOTE — CM/SW NOTE
Spoke with external transfer @ [facility]--to fax face sheet [phone] for insurance verification/checking of in network status. THE [facility] ambulance unable to provide vapotherm 95% 40 liters--they will assist with transfer ambulance transport if accepted.   Nu

## 2018-03-31 NOTE — PROGRESS NOTES
Critical Care Progress Note     Assessment / Plan:  1.  Acute hypoxic resp failure: due to suspected sarcoid exacerbation now w/ appearance of ARDS  -s/p bronch, initial labs not c/w infection, PCT negative, RVP negative, PJP from BAL negative   -finished 7

## 2018-03-31 NOTE — PROGRESS NOTES
JUDITH HOSPITALIST  Progress Note     Ray Rod Patient Status:  Inpatient    1950 MRN [id number]   [facility] 4SW-A Attending Antonette Domingo MD   [facility] Day # 15 PCP Barby Fernandes MD     Chief Complaint: ARDS    S: Patient re Propionate  2 spray Each Nare Daily   • DilTIAZem HCl ER Coated Beads  240 mg Oral Daily   • Potassium Chloride ER  40 mEq Oral Once   • furosemide  20 mg Oral Daily   • Insulin Aspart Pen  1-68 Units Subcutaneous TID CC   • Insulin Aspart Pen  1-68 Units

## 2018-04-02 NOTE — DISCHARGE SUMMARY
[facility] [place] HOSPITALIST  DISCHARGE SUMMARY     Melania Coleman Patient Status:  Inpatient    1950 MRN [id number]   [facility] 4SW-A Attending Huseyin Sandoval MD   [facility] Day # 15 PCP Violetta Oppenheim, MD     Date of Admission: 3/18/2018  Date nausea, vomiting, diarrhea. Brief Synopsis:   Patient admitted with cough shortness of breath dyspnea low-grade fevers.   Consults with pulmonology and cardiology obtained initially been started on Cardizem drip and given IV Lasix per was thought to be converted back to full code. And there was talk of transfer to [place] for further care and possible transplant evaluation.   Should her family are agreeable and there is surprisingly a bed available on 3/31 she was transferred with high levels of Vapotherm Inject 1-68 Units into the skin 3 (three) times daily with meals. 1 for every 3 grams of carbs eaten   Refills:  0     Insulin NPH (Human) (Isophane) 100 UNIT/ML Susp  Commonly known as:  HUMULIN N,NOVOLIN N      Inject 15 Units into the skin nightly.    Re apixaban      TAKE 1 TABLET BY MOUTH TWICE DAILY   Quantity:  180 tablet  Refills:  0     Glucose Blood Strp  Commonly known as:  ONETOUCH ULTRA BLUE      Check 3 times daily   Quantity:  100 each  Refills:  0     lansoprazole 30 MG Cpdr  Commonly known as scattered rhonchi  Cardiovascular: S1, S2. IR IR. No murmurs  Abdomen: Soft, nontender, nondistended. Positive bowel sounds. No rebound or guarding. Neurologic: No focal neurological deficits. Musculoskeletal: Moves all extremities.   Extremities: No ed

## 2018-04-04 NOTE — PAYOR COMM NOTE
--------------  DISCHARGE REVIEW    Payor: Niall [address] #:  [id number]  Authorization Number: [id number]    Admit date: 3/19/18  Admit time:  0145  Discharge Date: 3/31/2018  6:25 PM     Admitting Physician: Moriah Parson MD  Attendi to sarcoid flare and ARDS  2. Sarciod  Flare/sarcoidosis  3. ARDS  4. Chronic atrial fibrillation  5.  T2 DM    History of Present Illness:   Donovan Weaver is a 76year old female with a history of Atrial fibrillation on Eliquis, Essential hypertension, status was transferred to the ICU. She was started on Vapotherm-patient was increased on her steroids was followed by critical care pulmonology. Was felt that she had an ARDS-like picture on top of her sarcoid flare.   She has been on Vapotherm most of th known as:  XANAX      Take 1 tablet (0.25 mg total) by mouth nightly as needed for Anxiety or Sleep (while on bipap). Refills:  0     azathioprine 50 MG Tabs  Commonly known as:  IMURAN      Take 1 tablet (50 mg total) by mouth daily.    Refills:  0     D these medications      Instructions Prescription details   Fluticasone Propionate 50 MCG/ACT Susp  Commonly known as:  FLONASE  What changed:  · when to take this  · reasons to take this      2 sprays by Nasal route daily.    Quantity:  3 Bottle  Refills: as:   HUMALOG KWIKPEN        MetFORMIN HCl  MG Tb24  Commonly known as:  GLUCOPHAGE-XR               Follow-up appointment:   CHARAN Madrigal  [address] [id number] [phone]    Go on

## 2018-04-05 NOTE — PAYOR COMM NOTE
--------------  DISCHARGE REVIEW    Payor: [address] #:  [id number]  Authorization Number: [id number]    Admit date: 3/19/18  Admit time:  0145  Discharge Date: 3/31/2018  6:25 PM     Admitting Physician: Raghav Trujillo MD  Attendi

## 2019-02-28 VITALS
WEIGHT: 158 LBS | DIASTOLIC BLOOD PRESSURE: 86 MMHG | SYSTOLIC BLOOD PRESSURE: 144 MMHG | HEART RATE: 84 BPM | BODY MASS INDEX: 26.98 KG/M2 | HEIGHT: 64 IN

## 2019-03-07 ENCOUNTER — PATIENT OUTREACH (OUTPATIENT)
Dept: FAMILY MEDICINE CLINIC | Facility: CLINIC | Age: 69
End: 2019-03-07

## 2019-03-15 NOTE — PROGRESS NOTES
Working Dr Phan Mayer list and we no longer accept this insurance. Entering PCP removal request [facility]

## 2023-05-12 NOTE — PLAN OF CARE
Problem: CARDIOVASCULAR - ADULT  Goal: Maintains optimal cardiac output and hemodynamic stability  INTERVENTIONS:  - Monitor vital signs, rhythm, and trends  - Monitor for bleeding, hypotension and signs of decreased cardiac output  - Evaluate effectivenes Functional Mobility  Goal: Achieve highest/safest level of mobility/gait  Interventions:  - Assess patient's functional ability and stability  - Promote increasing activity/tolerance for mobility and gait  - Educate and engage patient/family in tolerated a gradual onset home

## 2023-10-24 NOTE — CM/SW NOTE
Kaia Abarca from [facility] [phone] called to say that they can accept pt. They will call pt at home prior to coming out to see her. Pt will be d/c'd home today. Thalidomide Pregnancy And Lactation Text: This medication is Pregnancy Category X and is absolutely contraindicated during pregnancy. It is unknown if it is excreted in breast milk.

## (undated) DEVICE — MEDI-VAC NON-CONDUCTIVE SUCTION TUBING: Brand: CARDINAL HEALTH

## (undated) DEVICE — 1200CC GUARDIAN II: Brand: GUARDIAN

## (undated) DEVICE — 3M™ RED DOT™ MONITORING ELECTRODE WITH FOAM TAPE AND STICKY GEL, 50/BAG, 20/CASE, 72/PLT 2570: Brand: RED DOT™

## (undated) DEVICE — FORCEPS BX 100CM 1.8MM RJ STD

## (undated) DEVICE — ENDOSCOPY PACK UPPER: Brand: MEDLINE INDUSTRIES, INC.

## (undated) DEVICE — FILTERLINE NASAL ADULT O2/CO2

## (undated) DEVICE — MASK ISOLATION

## (undated) DEVICE — 60 ML SYRINGE REGULAR TIP: Brand: MONOJECT

## (undated) DEVICE — KENDALL SCD EXPRESS SLEEVES, KNEE LENGTH, MEDIUM: Brand: KENDALL SCD

## (undated) DEVICE — SYRINGE 10ML SLIP TIP

## (undated) DEVICE — SINGLE USE SUCTION VALVE MAJ-209: Brand: SINGLE USE SUCTION VALVE (STERILE)

## (undated) DEVICE — GLOVE SURG TRIUMPH SZ 8

## (undated) DEVICE — SINGLE USE ASPIRATION NEEDLE: Brand: SINGLE USE ASPIRATION NEEDLE

## (undated) DEVICE — MEDI-VAC SUCTION HANDLE REGULAR CAPACITY: Brand: CARDINAL HEALTH

## (undated) DEVICE — BOWLS UTILITY 16OZ

## (undated) DEVICE — SPECIMEN TRAP LUKI

## (undated) DEVICE — SINGLE USE BIOPSY VALVE MAJ-210: Brand: SINGLE USE BIOPSY VALVE (STERILE)

## (undated) NOTE — ED AVS SNAPSHOT
Nazario Payan   MRN: [id number]    Department:  [facility] Emergency Department   Date of Visit:  12/10/2017           Disclosure     Insurance plans vary and the physician(s) referred by the ER may not be covered by your plan.  Please contact y tell this physician (or your personal doctor if your instructions are to return to your personal doctor) about any new or lasting problems. The primary care or specialist physician will see patients referred from the [facility] Emergency Department.  Álvaro Valdovinos

## (undated) NOTE — MR AVS SNAPSHOT
[address] [phone]               Thank you for choosing us for your health care visit with Marino Sutherland MD.  We are glad to serve you and happy to provide you with this insulin to carbohydrate and correction factor ratios will be calculated and, if already in use, will  be reviewed for accuracy and recalculated if necessary.   INDIVIDUAL FOLLOW-UP APPOINTMENTS To be determined at appointment  [address] [facility] ? Allow 2 business days for refills; controlled substances may take longer. ?  Contact your pharmacy at least 5 days prior to running out of medication and have them send an electronic request or submit request through the “request refill” option in your [place] insurance carrier to obtain pre-certification or prior authorization. Unfortunately, [facility] has seen an increase in denial of payment even though the procedure/test has been pre-certified.   You are strongly encouraged to contact your insurance carrier to v ONETOUCH DELICA LANCETS FINE Misc   1 each by Other route 2 (two) times daily.            Potassium Chloride ER 20 MEQ Tbcr   TK 1 T PO QD   Commonly known as:  K-DUR M20           TYLENOL EXTRA STRENGTH 500 MG Tabs   Generic drug:  acetaminophen   Take 50 track your progress   You don’t need to join a gym. Home exercises work great.  Put more priority on exercise in your life                    Visit [facility] online at  [email or web address]

## (undated) NOTE — MR AVS SNAPSHOT
[address] [address]               Thank you for choosing us for your health care visit with Tomas Rai MD.  We are glad to serve you and happy to provide you with this Refill policies:    ? Allow 2 business days for refills; controlled substances may take longer. ?  Contact your pharmacy at least 5 days prior to running out of medication and have them send an electronic request or submit request through the [facility] insurance carrier to obtain pre-certification or prior authorization. Unfortunately, MARYANN has seen an increase in denial of payment even though the procedure/test has been pre-certified.   You are strongly encouraged to contact your insurance carrier to v ONETOUCH DELICA LANCETS FINE Misc   1 each by Other route 2 (two) times daily. Potassium Chloride ER 20 MEQ Tbcr   TK 1 T PO QD   Commonly known as:  K-DUR M20           simvastatin 10 MG Tabs   Take 1 tablet (10 mg total) by mouth daily.    Comm

## (undated) NOTE — LETTER
August 15, 2017    Davey Dale  [address] [place] [id number]    Dear Aidan Leavitt: It was a pleasure speaking with you over the phone recently.  To follow up, I wanted to send you some contact information to utilize when you have a que Phone: [phone]. [phone]

## (undated) NOTE — MR AVS SNAPSHOT
Sam [address] [place] [id number] [phone]               Thank you for choosing us for your health care visit with Jac Acosta MD.  We are glad to serve you and happy to provide you with this at that time. STEP ONE SATISFIER This is a 1-hour appointment taught by a registered dietitian and diabetes educator. Half of this appointment time is dedicated to the development of a personalized education plan.   This plan is developed privately in a 1: Hemoglobin A1C in 3 months    Complete by:  Jul 09, 2017 (Approximate)    Assoc Dx:  Controlled type 2 diabetes mellitus without complication, without long-term current use of insulin (HCC) [E11.9]           Microalb/Creat Ratio, in 3 months    Complete b Medical Issues Discussed Today     Acute ischemic stroke [facility])    Atrial fibrillation (HCC)    Essential hypertension, benign    Chronic pain of both knees        Controlled type 2 diabetes mellitus without complication, without long-term current use of in taking this medication, and follow the directions you see here. Fluticasone Propionate 50 MCG/ACT Susp   2 sprays by Nasal route daily.    Commonly known as:  FLONASE           GlipiZIDE ER 5 MG Tb24   Take 1 tablet (5 mg total) by mouth 2 (two) t

## (undated) NOTE — MR AVS SNAPSHOT
[address], [address] [place] [phone]               Thank you for choosing us for your health care visit with Td Billingsley MD, PhD.  We are glad to serve you and happy to provide you with th physically brought to the pharmacy. A 30 day supply with no refills is the maximum allowed. ? If your prescription is due for a refill, you may be due for a follow up appointment.   ? To best provide you care, patients receiving routine medications need t Pcn [Penicillins]     Pravastatin Palpitations                Today's Vital Signs     BP Pulse Height Weight BMI    140/94 mmHg 84 62\" 164 lb 29.99 kg/m2         Current Medications          This list is accurate as of: 4/20/17 10:29 AM.  Always use your [email or web address]

## (undated) NOTE — MR AVS SNAPSHOT
Sadaf Bazan [address] [place] [id number] [phone]               Thank you for choosing us for your health care visit with Erika Holloway MD.  We are glad to serve you and happy to provide you with this Take  by mouth. ELIQUIS 5 MG Tabs   Generic drug:  apixaban   5 mg 2 (two) times daily. Fluticasone Propionate 50 MCG/ACT Susp   2 sprays by Nasal route daily.    Commonly known as:  FLONASE           GlipiZIDE ER 5 MG Tb24   Take 1 tabl

## (undated) NOTE — IP AVS SNAPSHOT
Patient Demographics     Address  [address] [place] [address]  Carole Stover [address] Phone  [phone] [facility])  [phone] (Mobile) *Preferred*      Emergency Contact(s)     Name Relation Home Work Raimundo Villeda Daughter [phone]    Eve 3. Limit your fluid intake to no more than 2 liters or 64 ounces per day    4. Some exercise and activity is important to help keep your heart functioning and strong.  Unless instructed not to exercise, you may walk at a slow to moderate pace for 10-15 kathy Instructions Authorizing Provider Morning Afternoon Evening As Needed   ALPRAZolam 0.25 MG Tabs  Commonly known as:  XANAX      Take 1 tablet (0.25 mg total) by mouth nightly as needed for Anxiety or Sleep (while on bipap).    Margoth Graves MD   [    ] Insulin Aspart Pen 100 UNIT/ML Sopn  Commonly known as:  Cheryl Colon  Start taking on:  4/1/2018      Inject 1-68 Units into the skin 3 (three) times daily with meals.  1 for every 3 grams of carbs eaten   Abbi Morris MD   [    ]   [    ]   [    ]   [    ] [    ]     Juani [place] LANCETS FINE Misc      1 each by Other route 3 (three) times daily.    Carlene Romero NP   [    ]   [    ]   [    ]   [    ]     Senna-Docusate Sodium 8.6-50 MG Tabs  Commonly known as:  SENOKOT S      Take 2 tablets by mouth 2 ( [id number] furosemide (LASIX) tab 20 mg 03/31/18 0949 Given            LEFT LOWER ARM     Order ID Medication Name Action Time Action Reason Comments    [id number] Insulin NPH (Human) (Isophane) (HUMULIN N,NOVOLIN N) 100 UNIT/ML injection 15 Units 03/30/18 Final result   Ordering provider:  Mya Lewis MD  03/30/18 9456 Resulting lab:  [facility]    Specimen Information    Type Source Collected On   Blood — 03/31/18 9759          Components    Component Value Reference Range Flag Lab   Potassium 4.0 3.6 HCT 38.5 34.0 - 50.0 % — [facility]   .0 150.0 - 450.0 10(3)uL [facility]   MCV 82.3 81.0 - 100.0 fL — [facility]   MCH 25.6 27.0 - 33.2 pg L [facility]   MCHC 31.2 31.0 - 37.0 g/dL — [facility]   RDW 15.3 11.5 - 16.0 % — [facility]   RDW-SD 45.9 Procedure                               Abnormality         Status                     ---------                               -----------         ------                     AFB CULTURE(P)[[id number]]                                   Preliminary result Reviewed by Astrid Talavera M.D. Pathology 03/23/18 at 2:02 PM      CELL COUNT/DIFF [facility]ON[facility]IAL [facility] Once [[id number]] Collected:  03/22/18 0913    Order Status:  Completed Lab Status:  Final result Updated:  03/22/18 1213    Specimen:   Body fluid, unspecifie  1950 MRN [id number]   Location [facility] [facility] Attending Rowena Reddy MD   [facility] Day #[PT.1] 0[PT. 2] PCP[PT.1] Amber Dey MD[PT.2]     Chief Complaint: Shortness of breath    History of Present Illness:[PT.1] [place] No current facility-administered medications on file prior to encounter.    Current Outpatient Prescriptions on File Prior to Encounter:  METFORMIN HCL  MG Oral Tablet 24 Hr TAKE 1 TABLET BY MOUTH WITH BREAKFAST AND 2 TABLETS BY MOUTH WITH DINNER Disp daily. (Patient taking differently: 2 sprays by Nasal route daily as needed.  ) Disp: 3 Bottle Rfl: 3   Bioflavonoid Products (BIOFLEX OR) Take 1 tablet by mouth daily.  Disp:  Rfl:[PT.2]        Review of Systems:   A comprehensive 14 point review of system 3. Essential hypertension[PT.1]  1. As above  2. Losartan  3. Hold Norvasc  4. Monitor blood pressure and heart rate  4. Diabetes mellitus  1. Toujeo > Levemir  2. Novolog with breakfast and lunch  3. Accuchecks  4. A1c[PT.3]  5. Hypokalemia[PT.1]  1.  Repl infiltrates concerning for pneumonia. Started on levaquin. Pt is feeling somewhat better. Was having fevers a couple of days ago but improved. Cough is currently resolved.        Past Medical History:   Diagnosis Date   • Arrhythmia     Afib: on Eloquis AmLODIPine Besylate 5 MG Oral Tab TAKE 1 TABLET(5 MG) BY MOUTH EVERY DAY Disp: 90 tablet Rfl: 1   Losartan Potassium 25 MG Oral Tab TAKE 1 TABLET(25 MG) BY MOUTH EVERY DAY Disp: 90 tablet Rfl: 1   Metoprolol Succinate ER 50 MG Oral Tablet 24 Hr TAKE 1 TABL 02/07/18 : 155 lb (70.3 kg)        Intake/Output Summary (Last 24 hours) at 03/21/18 1133  Last data filed at 03/21/18 1003   Gross per 24 hour   Intake             1045 ml   Output             1800 ml   Net             -755 ml[JM.1]       /63 (BP Lo ----------]    Recent Labs   Lab  03/19/18   0004  03/19/18   0432   ALT  26  22   AST  26  22     rvp negative  procalc on admit negative  Pro-bnp on admit 2041    Imaging: cxr: extensive bilat opacities. Echo: EF 65%; rvsp 49    ASSESSMENT/PLAN:    1. TECHNIQUE:  PA and lateral chest radiographs were obtained. PATIENT STATED HISTORY: (As transcribed by Technologist)  Patient provided no additional information.     FINDINGS:  LUNGS:  Extensive bilateral airspace consolidation involving the left upper lob ---------------------------------------------------------------------------- Procedure information:  Pulmonary Edema. A transthoracic complete 2D study was performed. Additional evaluation included M-mode, complete spectral Doppler, and color Doppler.  Inpa Right atrium:  The atrium was normal in size. Mitral valve:   Mildly calcified annulus. Mitral valve demonstrates non-specific thickening of the leaflets. Leaflet separation was normal. Doppler:  Transvalvular velocity was within the normal range.  There wa ---------  LV ejection fraction                            70    %      >=55   Ventricular septum                              Value        Reference  IVS thickness, ED, PLAX                         0.9   cm     0.6 - 0.9  IVS thickness, ED pressure, ED, DP                             20    mm Hg  ---------   Tricuspid valve                                 Value        Reference  Tricuspid regurg peak velocity                  3.1   m/sec  ---------  Tricuspid peak RV-RA gradient ---------------------------------------------------------------------------- History/Indications:  Sarcoidosis. Hypoxia. Respiratory failure.   Atrial fibrillation. ---------------------------------------------------------------------------- Procedure infor change. The heart appears less hyperdynamic on the current study. * ---------------------------------------------------------------------------- * Left ventricle:   The cavity size was normal. Wall thickness was normal. Systolic function was at the lower li equal to 50%).  Atrial septum:   Agitated saline contrast study showed no shunt, at baseline or with provocation. ---------------------------------------------------------------------------- Measurements  Left ventricle                       Value        Re Cath Angiogram Results (HF Patients only)    No exam resulted this encounter. Physical Therapy Notes (last 72 hours) (Notes from 3/28/2018  6:12 PM through 3/31/2018  6:12 PM)     No notes of this type exist for this encounter.       Occupational

## (undated) NOTE — Clinical Note
[facility] increased insulin having lows, readjusted and gave SS, her BG are doing a lot better and I think she understands how important this is will keep seeing her every 3 weeks until she is more stable thanks still needs eye exam

## (undated) NOTE — LETTER
[facility]  Moriah Haq 61 [address] [facility], [address] [place] [address]    Consent for Operation    Date: ______3/21/2018____________    Time: _______________    1.  I authorize the performance upon Alysa Wilburn the following operation:    Procedure(s):  [employer] videotape. The \[facility] [place] [facility]\"" will not be responsible for storage or maintenance of this tape. 6. For the purpose of advancing medical education, I consent to the admittance of observers to the Operating Room.     7. I authorize the use of any specimen, organs Signature of Patient:   ___________________________    When the patient is a minor or mentally incompetent to give consent:  Signature of person authorized to consent for patient: ___________________________   Relationship to patient: _____________________ drugs/illegal medications). Failure to inform my anesthesiologist about these medicines may increase my risk of anesthetic complications. · If I am allergic to anything or have had a reaction to anesthesia before.     3. I understand how the anesthesia med I have discussed the procedure and information above with the patient (or patient’s representative) and answered their questions. The patient or their representative has agreed to have anesthesia services.     _______________________________________________

## (undated) NOTE — LETTER
Consent to Procedure/Sedation    Date: _______3/21/2018___________    Time: _______________    1. I authorize the performance upon Alexandre Kelley the following: Bronchoscopy        2.  I authorize  ___________Anthony Coates______________ (and whomever ___________________________    ___________________    Witness: ____________________     Date: ______________    Printed: 3/21/2018   2:48 PM    Patient Name: Romaine Nieto        : 1950       Medical Record #: [id number]

## (undated) NOTE — Clinical Note
States she is improving, never brings readings so it is hard to make changes.  Will try to get her a sensor so we can have some actual data, will recheck a1c at next visit thanks

## (undated) NOTE — Clinical Note
Was 30 min late for visit, not following any dosing recommendations made, which is contributing to lows. Spent over 30 min today stressing need to take meds as recommended and proper SQ sites restressed again.  Today a1c 7.8 thanks

## (undated) NOTE — IP AVS SNAPSHOT
[facility] [place] [address] [place], [address] ~ [phone]                Discharge Summary   3/21/2017    [address] [facility],  [id number]           Admission Information        Provider Department    3/21/2017 Chilo Payne MD  7ne-A 2 sprays by Nasal route daily. Greta Moran                           GlipiZIDE ER 5 MG Tb24   Commonly known as:  GLIPIZIDE XL   Next dose due:  3/24/17 PM        Take 1 tablet (5 mg total) by mouth 2 (two) times daily with meals.     Docia Friday Why:  appointment time 2:20 pm    Contact information:    Arturo Dowlinggalina [phone]          Follow up with Anna Erickson MD, PhD On 4/20/2017.     Specialties:  Radiology, Diagnostic, Interventional, Neuro Radiology, [place] Your physician has referred you to the [facility] for Follow-up Diabetes Self-Management Training. To schedule your appointment at either our [place] Dasilva or [place] location call [phone].      If you are confident that you 3.14 (03/21/17)  0.72 (H) (03/21/17)  0.60 (H) (03/21/17)  5.88      Metabolic Lab Results  (Last result in the past 90 days)    HgbA1C Glucose BUN Creatinine Calcium Alkaline Phosph AST    (03/22/17)  9.2 (H) (03/24/17)  170 (H) (03/24/17)  10 (03/24/17) Visit [email or web address] to learn more.             _____________________________________________________________________________    Medication Side Effects - Medications to be taken at home  As your caregivers, we want you to be aware of the med Most common side effects: Stomach upset   What to report to your healthcare team: Stomach upset, unresolved pain           Respiratory Medications     Fluticasone Propionate 50 MCG/ACT Nasal Suspension       Use:  Treatment of asthma, COPD/emphysema, cough

## (undated) NOTE — Clinical Note
BG still high, didn't bring readings so hard to tell, put a sensor on her so I can more accurately dose insulin, increased metformin and insulin will keep you posted.  Thanks